# Patient Record
Sex: FEMALE | Race: WHITE | NOT HISPANIC OR LATINO | Employment: FULL TIME | URBAN - METROPOLITAN AREA
[De-identification: names, ages, dates, MRNs, and addresses within clinical notes are randomized per-mention and may not be internally consistent; named-entity substitution may affect disease eponyms.]

---

## 2017-02-28 ENCOUNTER — HOSPITAL ENCOUNTER (OUTPATIENT)
Dept: MAMMOGRAPHY | Facility: CLINIC | Age: 57
End: 2017-02-28
Attending: NURSE PRACTITIONER
Payer: COMMERCIAL

## 2017-02-28 ENCOUNTER — HOSPITAL ENCOUNTER (OUTPATIENT)
Dept: MAMMOGRAPHY | Facility: CLINIC | Age: 57
Discharge: HOME | End: 2017-02-28
Attending: NURSE PRACTITIONER | Admitting: NURSE PRACTITIONER
Payer: COMMERCIAL

## 2017-02-28 DIAGNOSIS — N64.4 BREAST PAIN: ICD-10-CM

## 2017-02-28 DIAGNOSIS — N64.59 BREAST THICKENING: ICD-10-CM

## 2017-02-28 PROCEDURE — 76642 ULTRASOUND BREAST LIMITED: CPT | Mod: LT

## 2017-02-28 PROCEDURE — G0204 DX MAMMO INCL CAD BI: HCPCS

## 2017-06-02 ENCOUNTER — HOSPITAL ENCOUNTER (OUTPATIENT)
Dept: MAMMOGRAPHY | Facility: CLINIC | Age: 57
End: 2017-06-02
Attending: NURSE PRACTITIONER
Payer: COMMERCIAL

## 2017-06-02 ENCOUNTER — HOSPITAL ENCOUNTER (OUTPATIENT)
Dept: MAMMOGRAPHY | Facility: CLINIC | Age: 57
Discharge: HOME | End: 2017-06-02
Attending: NURSE PRACTITIONER | Admitting: NURSE PRACTITIONER
Payer: COMMERCIAL

## 2017-06-02 DIAGNOSIS — N64.4 BREAST PAIN: ICD-10-CM

## 2017-06-02 PROCEDURE — G0279 TOMOSYNTHESIS, MAMMO: HCPCS

## 2017-06-02 PROCEDURE — 76642 ULTRASOUND BREAST LIMITED: CPT | Mod: RT

## 2018-04-23 ENCOUNTER — HOSPITAL ENCOUNTER (OUTPATIENT)
Dept: MAMMOGRAPHY | Facility: CLINIC | Age: 58
Discharge: HOME | End: 2018-04-23
Attending: NURSE PRACTITIONER | Admitting: NURSE PRACTITIONER
Payer: COMMERCIAL

## 2018-04-23 DIAGNOSIS — Z12.31 VISIT FOR SCREENING MAMMOGRAM: ICD-10-CM

## 2018-04-23 PROCEDURE — 77067 SCR MAMMO BI INCL CAD: CPT

## 2018-05-24 ENCOUNTER — HOSPITAL ENCOUNTER (OUTPATIENT)
Dept: CT IMAGING | Facility: CLINIC | Age: 58
Discharge: HOME | End: 2018-05-24
Attending: NURSE PRACTITIONER | Admitting: NURSE PRACTITIONER
Payer: COMMERCIAL

## 2018-05-24 DIAGNOSIS — M54.9 RIGHT-SIDED BACK PAIN, UNSPECIFIED BACK LOCATION, UNSPECIFIED CHRONICITY: ICD-10-CM

## 2018-05-24 DIAGNOSIS — R10.2 PELVIC PAIN: ICD-10-CM

## 2018-05-24 PROCEDURE — 25000125 ZZHC RX 250: Performed by: RADIOLOGY

## 2018-05-24 PROCEDURE — 74177 CT ABD & PELVIS W/CONTRAST: CPT

## 2018-05-24 PROCEDURE — 25000128 H RX IP 250 OP 636: Performed by: RADIOLOGY

## 2018-05-24 RX ORDER — IOPAMIDOL 755 MG/ML
110 INJECTION, SOLUTION INTRAVASCULAR ONCE
Status: COMPLETED | OUTPATIENT
Start: 2018-05-24 | End: 2018-05-24

## 2018-05-24 RX ADMIN — IOPAMIDOL 110 ML: 755 INJECTION, SOLUTION INTRAVENOUS at 14:54

## 2018-05-24 RX ADMIN — SODIUM CHLORIDE 72 ML: 9 INJECTION, SOLUTION INTRAVENOUS at 14:54

## 2019-06-26 ENCOUNTER — HOSPITAL ENCOUNTER (OUTPATIENT)
Dept: MAMMOGRAPHY | Facility: CLINIC | Age: 59
Discharge: HOME | End: 2019-06-26
Attending: NURSE PRACTITIONER | Admitting: NURSE PRACTITIONER
Payer: COMMERCIAL

## 2019-06-26 DIAGNOSIS — Z12.31 VISIT FOR SCREENING MAMMOGRAM: ICD-10-CM

## 2019-06-26 PROCEDURE — 77063 BREAST TOMOSYNTHESIS BI: CPT

## 2019-09-12 ENCOUNTER — TRANSFERRED RECORDS (OUTPATIENT)
Dept: HEALTH INFORMATION MANAGEMENT | Facility: CLINIC | Age: 59
End: 2019-09-12

## 2019-09-17 ENCOUNTER — MEDICAL CORRESPONDENCE (OUTPATIENT)
Dept: HEALTH INFORMATION MANAGEMENT | Facility: CLINIC | Age: 59
End: 2019-09-17

## 2019-09-18 ENCOUNTER — TRANSFERRED RECORDS (OUTPATIENT)
Dept: HEALTH INFORMATION MANAGEMENT | Facility: CLINIC | Age: 59
End: 2019-09-18

## 2019-11-08 ENCOUNTER — PRE VISIT (OUTPATIENT)
Dept: CARDIOLOGY | Facility: CLINIC | Age: 59
End: 2019-11-08

## 2019-11-20 ENCOUNTER — OFFICE VISIT (OUTPATIENT)
Dept: CARDIOLOGY | Facility: CLINIC | Age: 59
End: 2019-11-20
Payer: COMMERCIAL

## 2019-11-20 VITALS
DIASTOLIC BLOOD PRESSURE: 85 MMHG | BODY MASS INDEX: 37.45 KG/M2 | SYSTOLIC BLOOD PRESSURE: 127 MMHG | HEIGHT: 66 IN | HEART RATE: 86 BPM | WEIGHT: 233 LBS

## 2019-11-20 DIAGNOSIS — R73.03 BORDERLINE DIABETES MELLITUS: ICD-10-CM

## 2019-11-20 DIAGNOSIS — E78.2 MIXED HYPERLIPIDEMIA: Primary | ICD-10-CM

## 2019-11-20 PROCEDURE — 93000 ELECTROCARDIOGRAM COMPLETE: CPT | Performed by: INTERNAL MEDICINE

## 2019-11-20 PROCEDURE — 99204 OFFICE O/P NEW MOD 45 MIN: CPT | Mod: 25 | Performed by: INTERNAL MEDICINE

## 2019-11-20 RX ORDER — BUPROPION HYDROCHLORIDE 150 MG/1
450 TABLET, EXTENDED RELEASE ORAL DAILY
COMMUNITY
End: 2020-12-07

## 2019-11-20 RX ORDER — AMPICILLIN TRIHYDRATE 250 MG
900 CAPSULE ORAL DAILY
COMMUNITY
End: 2019-12-27

## 2019-11-20 RX ORDER — PRAVASTATIN SODIUM 20 MG
20 TABLET ORAL DAILY
Qty: 30 TABLET | Refills: 11 | Status: SHIPPED | OUTPATIENT
Start: 2019-11-20 | End: 2020-02-27

## 2019-11-20 RX ORDER — PANTETHINE 300 MG
450 TABLET, EXTENDED RELEASE ORAL 2 TIMES DAILY
COMMUNITY
End: 2020-12-07

## 2019-11-20 ASSESSMENT — MIFFLIN-ST. JEOR: SCORE: 1658.63

## 2019-11-20 NOTE — LETTER
11/20/2019      JOHAN Donaldson [facility] [place] [address]  [facility] [address]      RE: Dominique Craft       Dear Colleague,    I had the pleasure of seeing Dominique Craft in the [facility].    Service Date: 11/20/2019      REFERRING PROVIDER:  GLEN Nguyen.      HISTORY OF PRESENT ILLNESS:  Ms. Craft is a pleasant 59-year-old female with a history of hyperlipidemia and borderline diabetes.  She is referred to our clinic today for primary prevention and assistance with cholesterol.  She had been on simvastatin years ago and had a rare side effect of thyroid nodules and was subsequently taken off of this.  She has been on red yeast rice and had a vitamin E supplement added.  She had an NMR profile done in April and then subsequently again in September.  I did review these numbers today.  In September her total cholesterol was 194, triglycerides were 233, HDL 45, .  Her small dense LDL particle size was elevated at 679, apolipoprotein A1 159, CRP was elevated at 6.2.  Her hemoglobin A1c has dropped down to 5.3.  Electrolyte panel was normal.  She once again has been on red yeast rice and seems to tolerate this.  I did discuss that this is essentially unprocessed pravastatin, which is our weakest statin agent, and she likely will tolerate that medication prescribed but may benefit from knowing the actual dosage and would help us guide us to titrate as needed for her goals with her cholesterol.  She is willing to exchange red yeast rice for the prescribed pravastatin.  Her cardiac risk factors once again include the hyperlipidemia and borderline diabetes.  She has no family history of premature coronary artery disease.  She does have a remote history of tobacco abuse.  No history of high blood pressure or kidney disease.  She is asymptomatic from a cardiac perspective.  She denies any symptoms of chest pain or shortness of breath.  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      PHYSICAL EXAMINATION:   VITAL SIGNS:  On exam today, blood pressure is 127/85, pulse of 86, weight 233, body mass index of 37.   NECK:  Carotid upstrokes are brisk without bruit.   CARDIOVASCULAR:  Tones are regular without murmur, gallop or rub.   LUNGS:  Clear posteriorly.   EXTREMITIES:  She has strong pulses in the distal extremities without peripheral edema.      I did perform an electrocardiogram today, which I have reviewed and essentially appears normal.      SUMMARY:  Ms. Craft is a very pleasant 59-year-old female with a history of hyperlipidemia, borderline diabetes and elevated CRP.  She has tolerated red yeast rice with some modest improvement in her cholesterol numbers.  I have recommended prescribing her pravastatin that she will likely tolerate based on her tolerance of red yeast rice.  However, we will be able to titrate the dose accordingly to achieve our goals for her cholesterol.  I would recommend an LDL less than 100.  Apolipoprotein A1 is genetic and likely not going to change at all despite our treatment.  I do not feel strongly that we need to continue to monitor that level, and the same goes with the NMR profile now that we have that information.  Based upon the COURAGE study, I think the elevated CRP with her hyperlipidemia and borderline diabetes does place her with the strong recommendation for statin therapy.  Calcium scoring would not necessarily change my recommendation or management; however, I do feel there is some benefit giving her more of a visual image as well as her \"vascular age.\"  Occasionally, we do find significant calcifications and a focal area of a vessel that may prompt different management, such as an exercise stress test, especially since females may not have typical symptoms related to coronary disease, and I discussed this with her as well.  She would like to proceed with a CT " calcium scoring, so I will go ahead and order that for her.  I have prescribed her 20 mg of pravastatin for now in place of the red yeast rice and asked her to follow up with her primary to titrate the level of pravastatin to a goal of LDL less than 100.  I would encourage a healthy lifestyle which includes aerobic activity at least 20 minutes 3-4 times per week and following a heart healthy diet as well, such as the Mediterranean or DASH diet.  She will follow up with the test results of the CT calcium scoring.  I would recommend repeating a basic cholesterol profile in 8 weeks after starting pravastatin.  Please feel free to contact me with any questions you have in regards to her care.  Thank you for allowing me to participate in the care of your very nice patient.      cc:   Cornelia Brasher, [facility]    Clinic [facility], [address], [address] [place] [address]         JOSE ANTONIO CARDONA DO             D: 2019   T: 2019   MT: ERYN      Name:     SOTO DESAI   MRN:      [id number]        Account:      [id number]   :      1960           Service Date: 2019      Document: [id number]         Outpatient Encounter Medications as of 2019   Medication Sig Dispense Refill     buPROPion (WELLBUTRIN SR) 150 MG 12 hr tablet Take 450 mg by mouth daily       Cholecalciferol (VITAMIN D-3) 125 MCG (5000 UT) TABS Take by mouth daily       metFORMIN (GLUCOPHAGE) 500 MG tablet Take 500 mg by mouth 2 times daily (with meals)       Pantethine  MG TBCR Take 450 mg by mouth 2 times daily       pravastatin (PRAVACHOL) 20 MG tablet Take 1 tablet (20 mg) by mouth daily 30 tablet 11     red yeast rice 600 MG CAPS Take 900 mg by mouth daily       omeprazole (PRILOSEC) 20 MG capsule Take by mouth daily Once a daily       [DISCONTINUED] atorvastatin (LIPITOR) 20 MG tablet Take one tablet by mouth one time daily 90 tablet 2     No facility-administered encounter medications  on file as of 11/20/2019.        Again, thank you for allowing me to participate in the care of your patient.      Sincerely,    Nichole Sebastian,      [facility]

## 2019-11-20 NOTE — PROGRESS NOTES
Service Date: 11/20/2019      REFERRING PROVIDER:  GLEN Nguyen.      HISTORY OF PRESENT ILLNESS:  Ms. Craft is a pleasant 59-year-old female with a history of hyperlipidemia and borderline diabetes.  She is referred to our clinic today for primary prevention and assistance with cholesterol.  She had been on simvastatin years ago and had a rare side effect of thyroid nodules and was subsequently taken off of this.  She has been on red yeast rice and had a vitamin E supplement added.  She had an NMR profile done in April and then subsequently again in September.  I did review these numbers today.  In September her total cholesterol was 194, triglycerides were 233, HDL 45, .  Her small dense LDL particle size was elevated at 679, apolipoprotein A1 159, CRP was elevated at 6.2.  Her hemoglobin A1c has dropped down to 5.3.  Electrolyte panel was normal.  She once again has been on red yeast rice and seems to tolerate this.  I did discuss that this is essentially unprocessed pravastatin, which is our weakest statin agent, and she likely will tolerate that medication prescribed but may benefit from knowing the actual dosage and would help us guide us to titrate as needed for her goals with her cholesterol.  She is willing to exchange red yeast rice for the prescribed pravastatin.  Her cardiac risk factors once again include the hyperlipidemia and borderline diabetes.  She has no family history of premature coronary artery disease.  She does have a remote history of tobacco abuse.  No history of high blood pressure or kidney disease.  She is asymptomatic from a cardiac perspective.  She denies any symptoms of chest pain or shortness of breath.  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      PHYSICAL EXAMINATION:   VITAL SIGNS:  On exam today, blood pressure is 127/85, pulse of 86, weight 233, body mass index of 37.   NECK:  Carotid upstrokes are brisk without  "bruit.   CARDIOVASCULAR:  Tones are regular without murmur, gallop or rub.   LUNGS:  Clear posteriorly.   EXTREMITIES:  She has strong pulses in the distal extremities without peripheral edema.      I did perform an electrocardiogram today, which I have reviewed and essentially appears normal.      SUMMARY:  Ms. Craft is a very pleasant 59-year-old female with a history of hyperlipidemia, borderline diabetes and elevated CRP.  She has tolerated red yeast rice with some modest improvement in her cholesterol numbers.  I have recommended prescribing her pravastatin that she will likely tolerate based on her tolerance of red yeast rice.  However, we will be able to titrate the dose accordingly to achieve our goals for her cholesterol.  I would recommend an LDL less than 100.  Apolipoprotein A1 is genetic and likely not going to change at all despite our treatment.  I do not feel strongly that we need to continue to monitor that level, and the same goes with the NMR profile now that we have that information.  Based upon the COURAGE study, I think the elevated CRP with her hyperlipidemia and borderline diabetes does place her with the strong recommendation for statin therapy.  Calcium scoring would not necessarily change my recommendation or management; however, I do feel there is some benefit giving her more of a visual image as well as her \"vascular age.\"  Occasionally, we do find significant calcifications and a focal area of a vessel that may prompt different management, such as an exercise stress test, especially since females may not have typical symptoms related to coronary disease, and I discussed this with her as well.  She would like to proceed with a CT calcium scoring, so I will go ahead and order that for her.  I have prescribed her 20 mg of pravastatin for now in place of the red yeast rice and asked her to follow up with her primary to titrate the level of pravastatin to a goal of LDL less than 100.  I " would encourage a healthy lifestyle which includes aerobic activity at least 20 minutes 3-4 times per week and following a heart healthy diet as well, such as the Mediterranean or DASH diet.  She will follow up with the test results of the CT calcium scoring.  I would recommend repeating a basic cholesterol profile in 8 weeks after starting pravastatin.  Please feel free to contact me with any questions you have in regards to her care.  Thank you for allowing me to participate in the care of your very nice patient.      cc:   Cornelia Brasher, [facility], [address], [address] [place] [address]         JOSE ANTONIO CARDONA DO             D: 2019   T: 2019   MT: ERYN      Name:     SOTO DESAI   MRN:      [id number]        Account:      [id number]   :      1960           Service Date: 2019      Document: [id number]

## 2019-11-20 NOTE — LETTER
11/20/2019    Cornelia Brasher, [facility]  Clinic [place] [address]  [facility] [address]    RE: Dominique Craft       Dear Colleague,    I had the pleasure of seeing Dominique Craft in the [facility].    HPI and Plan:   See dictation    Orders Placed This Encounter   Procedures     CT Coronary Calcium Scan     Follow-Up with Cardiac Advanced Practice Provider     EKG 12-lead complete w/read - Clinics (performed today)       Orders Placed This Encounter   Medications     buPROPion (WELLBUTRIN SR) 150 MG 12 hr tablet     Sig: Take 450 mg by mouth daily     metFORMIN (GLUCOPHAGE) 500 MG tablet     Sig: Take 500 mg by mouth 2 times daily (with meals)     Cholecalciferol (VITAMIN D-3) 125 MCG (5000 UT) TABS     Sig: Take by mouth daily     red yeast rice 600 MG CAPS     Sig: Take 900 mg by mouth daily     Pantethine  MG TBCR     Sig: Take 450 mg by mouth 2 times daily     pravastatin (PRAVACHOL) 20 MG tablet     Sig: Take 1 tablet (20 mg) by mouth daily     Dispense:  30 tablet     Refill:  11       Medications Discontinued During This Encounter   Medication Reason     atorvastatin (LIPITOR) 20 MG tablet Not filled/taken by Patient         Encounter Diagnosis   Name Primary?     Mixed hyperlipidemia Yes       CURRENT MEDICATIONS:  Current Outpatient Medications   Medication Sig Dispense Refill     buPROPion (WELLBUTRIN SR) 150 MG 12 hr tablet Take 450 mg by mouth daily       Cholecalciferol (VITAMIN D-3) 125 MCG (5000 UT) TABS Take by mouth daily       metFORMIN (GLUCOPHAGE) 500 MG tablet Take 500 mg by mouth 2 times daily (with meals)       Pantethine  MG TBCR Take 450 mg by mouth 2 times daily       pravastatin (PRAVACHOL) 20 MG tablet Take 1 tablet (20 mg) by mouth daily 30 tablet 11     red yeast rice 600 MG CAPS Take 900 mg by mouth daily       omeprazole (PRILOSEC) 20 MG capsule Take by mouth daily Once a daily         ALLERGIES     Allergies    Allergen Reactions     Amoxicillin      Naproxen      Simvastatin        PAST MEDICAL HISTORY:  Past Medical History:   Diagnosis Date     DVT (deep venous thrombosis) (H)     after tummy tuck in , right leg     Epistaxis     ENT     Gallstones     s/p lisa     GERD (gastroesophageal reflux disease)      Hyperlipaemia      Insomnia      Nephrolithiasis      Obesity (BMI 30-39.9)      PE (pulmonary embolism)     after tummy tuch      Thyroid nodules     benign and biopsied, previously on Sythroid       PAST SURGICAL HISTORY:  Past Surgical History:   Procedure Laterality Date     APPENDECTOMY OPEN       CHOLECYSTECTOMY       COLONOSCOPY  3/28/2014    Procedure: COLONOSCOPY;  COLONOSCOPY (MAC SEDATION);  Surgeon: Thais Gregory MD;  Location:  GI     COSMETIC ABDOMINOPLASTY  2006 and 2007    tummy tuck and breast lift , revision of abdominoplasty in       HYSTERECTOMY  2008    prophylactic due to FH of cancer       FAMILY HISTORY:  Family History   Problem Relation Age of Onset     Cancer Sister         ovarian cancer     Obesity Sister         s/p Vitor-en-y     Cancer Mother         kidney, colon and liver     Cancer - colorectal Mother        SOCIAL HISTORY:  Social History     Socioeconomic History     Marital status:      Spouse name: None     Number of children: 0     Years of education: None     Highest education level: None   Occupational History     Occupation:      Employer: [employer]   Social Needs     Financial resource strain: None     Food insecurity:     Worry: None     Inability: None     Transportation needs:     Medical: None     Non-medical: None   Tobacco Use     Smoking status: Former Smoker     Packs/day: 1.00     Years: 25.00     Pack years: 25.00     Types: Cigarettes     Last attempt to quit: 1996     Years since quittin.4     Smokeless tobacco: Never Used   Substance and Sexual Activity     Alcohol use: Yes     Comment: 1/month      "Drug use: No     Sexual activity: Yes     Partners: Male     Birth control/protection: Other     Comment: hysterectomy   Lifestyle     Physical activity:     Days per week: None     Minutes per session: None     Stress: None   Relationships     Social connections:     Talks on phone: None     Gets together: None     Attends Zoroastrianism service: None     Active member of club or organization: None     Attends meetings of clubs or organizations: None     Relationship status: None     Intimate partner violence:     Fear of current or ex partner: None     Emotionally abused: None     Physically abused: None     Forced sexual activity: None   Other Topics Concern      Service No     Blood Transfusions No     Caffeine Concern Yes     Occupational Exposure No     Hobby Hazards No     Sleep Concern Yes     Stress Concern Yes     Weight Concern Yes     Special Diet No     Back Care No     Exercise Yes     Bike Helmet No     Seat Belt Yes     Self-Exams Yes     Parent/sibling w/ CABG, MI or angioplasty before 65F 55M? No   Social History Narrative           Review of Systems:  Skin:  Negative       Eyes:  Positive for glasses    ENT:  Negative      Respiratory:  Negative       Cardiovascular:  Negative;palpitations;chest pain;dizziness;syncope or near-syncope;cyanosis;lightheadedness;exercise intolerance;fatigue;edema      Gastroenterology: Negative      Genitourinary:  Negative      Musculoskeletal:  Negative      Neurologic:  Negative      Psychiatric:  Positive for excessive stress managed with medication  Heme/Lymph/Imm:  Negative      Endocrine:  Negative        Physical Exam:  Vitals: /85 (BP Location: Left arm, Cuff Size: Adult Large)   Pulse 86   Ht 1.676 m (5' 6\")   Wt 105.7 kg (233 lb)   BMI 37.61 kg/m       Constitutional:  cooperative;in no acute distress        Skin:  warm and dry to the touch          Head:  normocephalic        Eyes:  pupils equal and round        Lymph:      ENT:  no " pallor or cyanosis        Neck:  carotid pulses are full and equal bilaterally        Respiratory:  clear to auscultation;normal symmetry         Cardiac: regular rhythm;no murmurs, gallops or rubs detected                pulses full and equal                                        GI:  abdomen soft;no bruits        Extremities and Muscular Skeletal:  no deformities, clubbing, cyanosis, erythema observed;no edema              Neurological:  no gross motor deficits;affect appropriate        Psych:  Alert and Oriented x 3          CC  No referring provider defined for this encounter.                    Thank you for allowing me to participate in the care of your patient.      Sincerely,     Nichole Sebastian, DO     [facility]    cc:   No referring provider defined for this encounter.

## 2019-11-20 NOTE — PROGRESS NOTES
HPI and Plan:   See dictation    Orders Placed This Encounter   Procedures     CT Coronary Calcium Scan     Follow-Up with Cardiac Advanced Practice Provider     EKG 12-lead complete w/read - Clinics (performed today)       Orders Placed This Encounter   Medications     buPROPion (WELLBUTRIN SR) 150 MG 12 hr tablet     Sig: Take 450 mg by mouth daily     metFORMIN (GLUCOPHAGE) 500 MG tablet     Sig: Take 500 mg by mouth 2 times daily (with meals)     Cholecalciferol (VITAMIN D-3) 125 MCG (5000 UT) TABS     Sig: Take by mouth daily     red yeast rice 600 MG CAPS     Sig: Take 900 mg by mouth daily     Pantethine  MG TBCR     Sig: Take 450 mg by mouth 2 times daily     pravastatin (PRAVACHOL) 20 MG tablet     Sig: Take 1 tablet (20 mg) by mouth daily     Dispense:  30 tablet     Refill:  11       Medications Discontinued During This Encounter   Medication Reason     atorvastatin (LIPITOR) 20 MG tablet Not filled/taken by Patient         Encounter Diagnosis   Name Primary?     Mixed hyperlipidemia Yes       CURRENT MEDICATIONS:  Current Outpatient Medications   Medication Sig Dispense Refill     buPROPion (WELLBUTRIN SR) 150 MG 12 hr tablet Take 450 mg by mouth daily       Cholecalciferol (VITAMIN D-3) 125 MCG (5000 UT) TABS Take by mouth daily       metFORMIN (GLUCOPHAGE) 500 MG tablet Take 500 mg by mouth 2 times daily (with meals)       Pantethine  MG TBCR Take 450 mg by mouth 2 times daily       pravastatin (PRAVACHOL) 20 MG tablet Take 1 tablet (20 mg) by mouth daily 30 tablet 11     red yeast rice 600 MG CAPS Take 900 mg by mouth daily       omeprazole (PRILOSEC) 20 MG capsule Take by mouth daily Once a daily         ALLERGIES     Allergies   Allergen Reactions     Amoxicillin      Naproxen      Simvastatin        PAST MEDICAL HISTORY:  Past Medical History:   Diagnosis Date     DVT (deep venous thrombosis) (H)     after tummy tuck in 2006, right leg     Epistaxis     ENT     Gallstones     s/p lisa      GERD (gastroesophageal reflux disease)      Hyperlipaemia      Insomnia      Nephrolithiasis      Obesity (BMI 30-39.9)      PE (pulmonary embolism)     after tummy tuch      Thyroid nodules     benign and biopsied, previously on Sythroid       PAST SURGICAL HISTORY:  Past Surgical History:   Procedure Laterality Date     APPENDECTOMY OPEN       CHOLECYSTECTOMY       COLONOSCOPY  3/28/2014    Procedure: COLONOSCOPY;  COLONOSCOPY (MAC SEDATION);  Surgeon: Thais Gregory MD;  Location:  GI     COSMETIC ABDOMINOPLASTY  2006 and 2007    tummy tuck and breast lift , revision of abdominoplasty in       HYSTERECTOMY  2008    prophylactic due to FH of cancer       FAMILY HISTORY:  Family History   Problem Relation Age of Onset     Cancer Sister         ovarian cancer     Obesity Sister         s/p Vitor-en-y     Cancer Mother         kidney, colon and liver     Cancer - colorectal Mother        SOCIAL HISTORY:  Social History     Socioeconomic History     Marital status:      Spouse name: None     Number of children: 0     Years of education: None     Highest education level: None   Occupational History     Occupation:      Employer: [employer]   Social Needs     Financial resource strain: None     Food insecurity:     Worry: None     Inability: None     Transportation needs:     Medical: None     Non-medical: None   Tobacco Use     Smoking status: Former Smoker     Packs/day: 1.00     Years: 25.00     Pack years: 25.00     Types: Cigarettes     Last attempt to quit: 1996     Years since quittin.4     Smokeless tobacco: Never Used   Substance and Sexual Activity     Alcohol use: Yes     Comment: 1/month     Drug use: No     Sexual activity: Yes     Partners: Male     Birth control/protection: Other     Comment: hysterectomy   Lifestyle     Physical activity:     Days per week: None     Minutes per session: None     Stress: None   Relationships     Social connections:      "Talks on phone: None     Gets together: None     Attends Buddhism service: None     Active member of club or organization: None     Attends meetings of clubs or organizations: None     Relationship status: None     Intimate partner violence:     Fear of current or ex partner: None     Emotionally abused: None     Physically abused: None     Forced sexual activity: None   Other Topics Concern      Service No     Blood Transfusions No     Caffeine Concern Yes     Occupational Exposure No     Hobby Hazards No     Sleep Concern Yes     Stress Concern Yes     Weight Concern Yes     Special Diet No     Back Care No     Exercise Yes     Bike Helmet No     Seat Belt Yes     Self-Exams Yes     Parent/sibling w/ CABG, MI or angioplasty before 65F 55M? No   Social History Narrative           Review of Systems:  Skin:  Negative       Eyes:  Positive for glasses    ENT:  Negative      Respiratory:  Negative       Cardiovascular:  Negative;palpitations;chest pain;dizziness;syncope or near-syncope;cyanosis;lightheadedness;exercise intolerance;fatigue;edema      Gastroenterology: Negative      Genitourinary:  Negative      Musculoskeletal:  Negative      Neurologic:  Negative      Psychiatric:  Positive for excessive stress managed with medication  Heme/Lymph/Imm:  Negative      Endocrine:  Negative        Physical Exam:  Vitals: /85 (BP Location: Left arm, Cuff Size: Adult Large)   Pulse 86   Ht 1.676 m (5' 6\")   Wt 105.7 kg (233 lb)   BMI 37.61 kg/m      Constitutional:  cooperative;in no acute distress        Skin:  warm and dry to the touch          Head:  normocephalic        Eyes:  pupils equal and round        Lymph:      ENT:  no pallor or cyanosis        Neck:  carotid pulses are full and equal bilaterally        Respiratory:  clear to auscultation;normal symmetry         Cardiac: regular rhythm;no murmurs, gallops or rubs detected                pulses full and equal                               "          GI:  abdomen soft;no bruits        Extremities and Muscular Skeletal:  no deformities, clubbing, cyanosis, erythema observed;no edema              Neurological:  no gross motor deficits;affect appropriate        Psych:  Alert and Oriented x 3          CC  No referring provider defined for this encounter.

## 2019-12-26 ENCOUNTER — HOSPITAL ENCOUNTER (OUTPATIENT)
Dept: CARDIOLOGY | Facility: CLINIC | Age: 59
Discharge: HOME | End: 2019-12-26
Attending: INTERNAL MEDICINE | Admitting: INTERNAL MEDICINE
Payer: COMMERCIAL

## 2019-12-26 DIAGNOSIS — E78.2 MIXED HYPERLIPIDEMIA: ICD-10-CM

## 2019-12-26 PROCEDURE — 75571 CT HRT W/O DYE W/CA TEST: CPT | Mod: 26 | Performed by: INTERNAL MEDICINE

## 2019-12-26 PROCEDURE — 75571 CT HRT W/O DYE W/CA TEST: CPT | Mod: GA

## 2019-12-27 ENCOUNTER — OFFICE VISIT (OUTPATIENT)
Dept: CARDIOLOGY | Facility: CLINIC | Age: 59
End: 2019-12-27
Attending: INTERNAL MEDICINE
Payer: COMMERCIAL

## 2019-12-27 VITALS
SYSTOLIC BLOOD PRESSURE: 120 MMHG | HEIGHT: 66 IN | HEART RATE: 91 BPM | BODY MASS INDEX: 36.64 KG/M2 | WEIGHT: 228 LBS | DIASTOLIC BLOOD PRESSURE: 87 MMHG

## 2019-12-27 DIAGNOSIS — E78.2 MIXED HYPERLIPIDEMIA: ICD-10-CM

## 2019-12-27 PROCEDURE — 99213 OFFICE O/P EST LOW 20 MIN: CPT | Performed by: NURSE PRACTITIONER

## 2019-12-27 ASSESSMENT — MIFFLIN-ST. JEOR: SCORE: 1635.95

## 2019-12-27 NOTE — LETTER
12/27/2019    JOHAN Donaldson [facility] Sandra Martin [address]  [facility] [address]    RE: Dominique Craft       Dear Colleague,    I had the pleasure of seeing Dominique Craft in the [facility].    Cardiology Clinic Progress Note  Dominique Craft MRN# [id number]   YOB: 1960 Age: 59 year old     Reason For Visit:  Review of results   Primary Cardiologist:   Dr. Sebastian          History of Presenting Illness:      Dominique Craft is a pleasant 59 year old patient who carries a past medical history significant for hyperlipidemia and borderline diabetes.    She was last seen on 11/20/2019 for evaluation and management of her cholesterol.  Last lipid panel showed a total cholesterol of 194, triglycerides 233, HDL 45, .  She reports significant side effects to statins in the past and resorted to treating her cholesterol with red yeast rice.  She was recommended to start low-dose pravastatin as it is a weak statin but would allow us to uptitrate as needed to achieve her cholesterol goal.  She also underwent a CT coronary calcium scoring that showed a score of 0.  She returns to the office today for review of results.     She is feeling well on a cardiac standpoint, denies chest pain, shortness of breath, PND, orthopnea, presyncope, syncope, edema, heart racing, or palpitations.  She reports no side effects such as myalgia pains since starting on low-dose pravastatin.  She plans to have a follow-up lipid panel next week.    Blood pressure is well controlled at 120/87  BMI 36.8, down approximately 5 pounds over the last month.    Activity level is unchanged  Follows a heart healthy diet  Remains compliant with all medications.                   Assessment and Plan:       1.  Hyperlipidemia -last , initiated on low-dose pravastatin due to side effects with higher dose statins.  Scheduled for follow-up lipid panel next week.  CT  coronary calcium score is 0.  Recommend risk modification including exercise, weight loss, and heart healthy diet (DASH diet).  Will notify her of the results of her fasting lipid panel.  Recommend she follow-up with her PCP annually and  heart as needed.                Thank you for allowing me to participate in this delightful patient's care.        Jennifer Reddy, JOHAN CNP         Review of Systems:     Review of Systems:  Skin:  Negative     Eyes:  Positive for glasses  ENT:  Negative    Respiratory:  Negative    Cardiovascular:  Negative    Gastroenterology: Negative    Genitourinary:  Negative    Musculoskeletal:  Negative    Neurologic:  Negative    Psychiatric:  Positive for excessive stress  Heme/Lymph/Imm:  Negative    Endocrine:  Negative                Physical Exam:     GEN:  In general, this is a overweight female in no acute distress.  HEENT:  Pupils equal, round. Sclerae nonicteric. Clear oropharynx. Mucous membranes moist.  NECK: Supple, no masses appreciated. Trachea midline.  No JVD   C/V:  Regular rate and rhythm, no murmur, rub or gallop. No S3 or RV heave.   RESP: Respirations are unlabored. No use of accessory muscles. Clear to auscultation bilaterally without wheezing, rales, or rhonchi.  GI: Abdomen soft, nontender, nondistended. No HSM appreciated.   EXTREM: No LE edema. No cyanosis or clubbing.  NEURO: Alert and oriented, cooperative. No obvious focal deficits.   PSYCH: Normal affect.  SKIN: Warm and dry. No rashes or petechiae appreciated.          Past Medical History:     Past Medical History:   Diagnosis Date     DVT (deep venous thrombosis) (H)     after tummy tuck in 2006, right leg     Epistaxis     ENT     Gallstones     s/p lisa     GERD (gastroesophageal reflux disease)      Hyperlipaemia      Insomnia      Nephrolithiasis      Obesity (BMI 30-39.9)      PE (pulmonary embolism)     after tummy tuch 2006     Thyroid nodules     benign and biopsied, previously on Sythroid               Past Surgical History:     Past Surgical History:   Procedure Laterality Date     APPENDECTOMY OPEN  1981     CHOLECYSTECTOMY  1993     COLONOSCOPY  3/28/2014    Procedure: COLONOSCOPY;  COLONOSCOPY (MAC SEDATION);  Surgeon: Thais Gregory MD;  Location:  GI     COSMETIC ABDOMINOPLASTY  2/2006 and 03/2007    tummy tuck and breast lift , revision of abdominoplasty in  2007     HYSTERECTOMY  2008    prophylactic due to FH of cancer              Allergies:   Amoxicillin; Naproxen; and Simvastatin       Data:   All laboratory data reviewed:    LAST CHOLESTEROL:  Lab Results   Component Value Date    CHOL 158 10/06/2014     Lab Results   Component Value Date    HDL 43 10/06/2014     Lab Results   Component Value Date    LDL 79 10/06/2014     Lab Results   Component Value Date    TRIG 178 10/06/2014     Lab Results   Component Value Date    CHOLHDLRATIO 3.7 10/06/2014       LAST BMP:  Lab Results   Component Value Date     09/20/2013      Lab Results   Component Value Date    POTASSIUM 4.3 09/20/2013     Lab Results   Component Value Date    CHLORIDE 105 09/20/2013     Lab Results   Component Value Date    FLEX 9.3 09/20/2013     Lab Results   Component Value Date    CO2 27 09/20/2013     Lab Results   Component Value Date    BUN 15 09/20/2013     Lab Results   Component Value Date    CR 0.85 09/20/2013     Lab Results   Component Value Date    GLC 97 09/20/2013       LAST CBC:  Lab Results   Component Value Date    WBC 6.2 03/28/2014     Lab Results   Component Value Date    RBC 4.95 03/28/2014     Lab Results   Component Value Date    HGB 14.7 03/28/2014     Lab Results   Component Value Date    HCT 44.7 03/28/2014     Lab Results   Component Value Date    MCV 90 03/28/2014     Lab Results   Component Value Date    MCH 29.7 03/28/2014     Lab Results   Component Value Date    MCHC 32.9 03/28/2014     Lab Results   Component Value Date    RDW 13.4 03/28/2014     Lab Results   Component Value Date      03/28/2014         Thank you for allowing me to participate in the care of your patient.    Sincerely,     JOHAN Chapa [facility]

## 2019-12-27 NOTE — PROGRESS NOTES
Cardiology Clinic Progress Note  Dominique Craft MRN# [id number]   YOB: 1960 Age: 59 year old     Reason For Visit:  Review of results   Primary Cardiologist:   Dr. Sebastian          History of Presenting Illness:      Dominique Craft is a pleasant 59 year old patient who carries a past medical history significant for hyperlipidemia and borderline diabetes.    She was last seen on 11/20/2019 for evaluation and management of her cholesterol.  Last lipid panel showed a total cholesterol of 194, triglycerides 233, HDL 45, .  She reports significant side effects to statins in the past and resorted to treating her cholesterol with red yeast rice.  She was recommended to start low-dose pravastatin as it is a weak statin but would allow us to uptitrate as needed to achieve her cholesterol goal.  She also underwent a CT coronary calcium scoring that showed a score of 0.  She returns to the office today for review of results.     She is feeling well on a cardiac standpoint, denies chest pain, shortness of breath, PND, orthopnea, presyncope, syncope, edema, heart racing, or palpitations.  She reports no side effects such as myalgia pains since starting on low-dose pravastatin.  She plans to have a follow-up lipid panel next week.    Blood pressure is well controlled at 120/87  BMI 36.8, down approximately 5 pounds over the last month.    Activity level is unchanged  Follows a heart healthy diet  Remains compliant with all medications.                   Assessment and Plan:       1.  Hyperlipidemia -last , initiated on low-dose pravastatin due to side effects with higher dose statins.  Scheduled for follow-up lipid panel next week.  CT coronary calcium score is 0.  Recommend risk modification including exercise, weight loss, and heart healthy diet (DASH diet).  Will notify her of the results of her fasting lipid panel.  Recommend she follow-up with her PCP annually and  heart as needed.                 Thank you for allowing me to participate in this delightful patient's care.        Jennifer Reddy, APRN CNP         Review of Systems:     Review of Systems:  Skin:  Negative     Eyes:  Positive for glasses  ENT:  Negative    Respiratory:  Negative    Cardiovascular:  Negative    Gastroenterology: Negative    Genitourinary:  Negative    Musculoskeletal:  Negative    Neurologic:  Negative    Psychiatric:  Positive for excessive stress  Heme/Lymph/Imm:  Negative    Endocrine:  Negative                Physical Exam:     GEN:  In general, this is a overweight female in no acute distress.  HEENT:  Pupils equal, round. Sclerae nonicteric. Clear oropharynx. Mucous membranes moist.  NECK: Supple, no masses appreciated. Trachea midline.  No JVD   C/V:  Regular rate and rhythm, no murmur, rub or gallop. No S3 or RV heave.   RESP: Respirations are unlabored. No use of accessory muscles. Clear to auscultation bilaterally without wheezing, rales, or rhonchi.  GI: Abdomen soft, nontender, nondistended. No HSM appreciated.   EXTREM: No LE edema. No cyanosis or clubbing.  NEURO: Alert and oriented, cooperative. No obvious focal deficits.   PSYCH: Normal affect.  SKIN: Warm and dry. No rashes or petechiae appreciated.          Past Medical History:     Past Medical History:   Diagnosis Date     DVT (deep venous thrombosis) (H)     after tummy tuck in 2006, right leg     Epistaxis     ENT     Gallstones     s/p lisa     GERD (gastroesophageal reflux disease)      Hyperlipaemia      Insomnia      Nephrolithiasis      Obesity (BMI 30-39.9)      PE (pulmonary embolism)     after tummy tuch 2006     Thyroid nodules     benign and biopsied, previously on Sythroid              Past Surgical History:     Past Surgical History:   Procedure Laterality Date     APPENDECTOMY OPEN  1981     CHOLECYSTECTOMY  1993     COLONOSCOPY  3/28/2014    Procedure: COLONOSCOPY;  COLONOSCOPY (MAC SEDATION);  Surgeon: Thais Gregory MD;   Location:  GI     COSMETIC ABDOMINOPLASTY  2/2006 and 03/2007    tummy tuck and breast lift , revision of abdominoplasty in  2007     HYSTERECTOMY  2008    prophylactic due to FH of cancer              Allergies:   Amoxicillin; Naproxen; and Simvastatin       Data:   All laboratory data reviewed:    LAST CHOLESTEROL:  Lab Results   Component Value Date    CHOL 158 10/06/2014     Lab Results   Component Value Date    HDL 43 10/06/2014     Lab Results   Component Value Date    LDL 79 10/06/2014     Lab Results   Component Value Date    TRIG 178 10/06/2014     Lab Results   Component Value Date    CHOLHDLRATIO 3.7 10/06/2014       LAST BMP:  Lab Results   Component Value Date     09/20/2013      Lab Results   Component Value Date    POTASSIUM 4.3 09/20/2013     Lab Results   Component Value Date    CHLORIDE 105 09/20/2013     Lab Results   Component Value Date    FLEX 9.3 09/20/2013     Lab Results   Component Value Date    CO2 27 09/20/2013     Lab Results   Component Value Date    BUN 15 09/20/2013     Lab Results   Component Value Date    CR 0.85 09/20/2013     Lab Results   Component Value Date    GLC 97 09/20/2013       LAST CBC:  Lab Results   Component Value Date    WBC 6.2 03/28/2014     Lab Results   Component Value Date    RBC 4.95 03/28/2014     Lab Results   Component Value Date    HGB 14.7 03/28/2014     Lab Results   Component Value Date    HCT 44.7 03/28/2014     Lab Results   Component Value Date    MCV 90 03/28/2014     Lab Results   Component Value Date    MCH 29.7 03/28/2014     Lab Results   Component Value Date    MCHC 32.9 03/28/2014     Lab Results   Component Value Date    RDW 13.4 03/28/2014     Lab Results   Component Value Date     03/28/2014

## 2019-12-27 NOTE — PATIENT INSTRUCTIONS
Thanks for coming into [facility] today.    Reviewed results of Calcium score - 0  Will get Cholesterol level next week for follow up on pravastatin.   Blood pressures well controlled  Encourage exercise, weight loss, and heart healthy diet.   Follow up with PCP annually and  Heart as needed       Please call my nurse at [phone] with any questions or concerns.    Scheduling phone number: [phone]  Reminder: Please bring in all current medications, over the counter supplements and vitamin bottles to your next appointment.        Patient Education     Lowering Your Blood Pressure with DASH  What is the DASH eating plan?  DASH (Dietary Approaches to Stop Hypertension) can help you prevent or lower high blood pressure. This eating plan provides the nutrients that help lower blood pressure: potassium, magnesium, calcium, protein and fiber.   If not controlled, high blood pressure may lead to heart disease, stroke or blindness.  This eating plan is rich in:     Fruits and vegetables    Whole grains    Fat-free or low-fat milk products    Fish and poultry (chicken, turkey, etc.)    Beans, seeds and nuts.  This eating plan is low in:     Salt and sodium    Sugar, sweets and drinks with sugar    Red meat, saturated fat and trans fat.  Lifestyle changes  Besides a healthy eating plan, other steps are needed to help control high blood pressure.     Stay at a healthy weight.    Be active for at least 30 minutes on most days.    If you drink alcohol, have no more than two drinks per day (for men) or one per day (for women).    If you take blood pressure medicine, take it as directed.  Losing weight with DASH  You can lose weight by eating fewer calories. It is best to take off pounds slowly over time. Talk to a dietitian about the best way to do this.  Staying active   To shed pounds, combine DASH with daily exercise. Start with a 15-minute walk each day. Slowly increase the time until you  reach a total of 30 minutes on most days. To avoid weight gain, try for 60 minutes each day. Check with your doctor before starting any exercise program.  Tips for less sodium    Avoid processed foods. These may include baked goods, cereals, soy sauce and even antacids.    Cook with less salt. Don't bring the saltshaker to the table.    Season food with herbs, spices, lemon, lime, vinegar, wine and salt-free seasoning blends.    Use low-sodium canned vegetables or fruits.  Tips to ease the change  Take a week or two to slowly make changes to your diet.    Add a serving of vegetables at lunch one day. The next day, add a serving at dinner as well.    Add fruit to one meal or eat it as a snack.    Work up to three servings of fat-free and low-fat milk products each day.    If you eat large portions of meat, cut back by a third at each meal. The goal is to eat 6 oz (ounces) of meat or less per day.    Serve brown rice and whole-wheat bread and pasta.    Try casseroles and stir-lo dishes that have less meat and more vegetables, grains or cooked dry beans.    Serve two or more meatless meals each week.    For snacks and desserts, eat foods low in fat, sodium and sugar, such as:  ? Unsalted rice cakes, nuts or seeds  ? Fresh fruits, raw vegetables and raisins  ? Fat-free, low-fat or frozen yogurt  ? Popcorn with no salt or butter.    If you have trouble digesting milk products, try lactose-free milk or take lactase pills.    If you have a nut allergy, eat seeds, beans, lentils or split peas.    Fruits, vegetables and whole grain foods are high in fiber. To avoid bloating and diarrhea (loose, watery stools), increase these foods over several weeks.  The DASH eating plan  Use this chart to plan your meals. Or take it with you when you shop for food.   Food Group Servings per Day  Serving Size Examples    1,600 Calories 2,000 Calories 2,600 Calories      Grains  (whole wheat) 6 6 to 8 10 to 11   1 slice bread    1 oz dry  cereal      cup cooked rice, pasta or cereal Whole-wheat bread and rolls, whole-wheat pasta, English muffin, estela bread, bagel, cereals, grits, oatmeal, brown rice, unsalted pretzels and popcorn   Vegetables  3 to 4 4 to 5 5 to 6   1 cup raw leafy vegetables      cup cut-up raw or cooked vegetable      cup vegetable juice Broccoli, carrots, collards, green beans, green peas, kale, lima beans, potatoes, spinach, squash, sweet potatoes, tomatoes   Fruits 4 4 to 5 5 to 6   1 medium fruit      cup dried fruit      cup fresh, frozen, or canned fruit      cup fruit juice Apples, apricots, bananas, dates, grapes, oranges, grapefruit, mangoes, melons, peaches, pineapples, raisins, strawberries, tangerines   Fat-free or   low-fat milk and milk products 2 to 3 2 to 3 3   1 cup milk or yogurt    1   oz cheese Fat-free or low-fat (1%) milk, buttermilk, cheese, regular or frozen yogurt   Lean meats, poultry and fish 3 to 6 6 or less 6   1 oz cooked meats, poultry (chicken, turkey) or fish    1 egg    2 egg whites Lean meats (trim away any fat, then broil, roast or poach); remove skin from poultry. Eggs are high in cholesterol, so limit egg yolks to four or less per week.   Nuts, seeds   and legumes 3 per week 4 to 5 per week 1 per day   ? cup (1   oz) nuts    2 Tbsp peanut butter    2 Tbsp (   oz) seeds      cup cooked legumes (dry beans and peas) Almonds, hazelnuts, mixed nuts, peanuts, walnuts, sunflower seeds, peanut butter, kidney beans, lentils, split peas   Fats and oils 2 2 to 3 3   1 tsp soft margarine    1 tsp vegetable oil    1 Tbsp fried    2 Tbsp salad dressing Soft margarine, vegetable oil (such as canola, corn, olive or safflower), low-fat mayonnaise, light salad dressing   Sweets and   added sugars 0 5 or less per week 2 or less per day   1 Tbsp sugar, jelly or jam      cup sorbet or gelatin    1 cup lemonade Fruit-flavored gelatin, fruit punch, hard candy, jelly, maple syrup, sorbets and ices   A sample meal  "plan  This sample menu gives two sodium levels. Start with 2,300 mg of sodium (about 1 teaspoon of table salt per day). Then, try to lower your intake to 1,500 mg a day. Talk to your doctor or dietitian about how to do this.   These samples are for people who eat 2,000 calories per day. The less salt you eat, the more you may lower your blood pressure.   Menu for 2,300 mg sodium per day   Breakfast:   cup instant oatmeal, 1 mini whole-wheat bagel, 1 tablespoon peanut butter, 1 medium banana, 1 cup (8 ounces) low-fat milk.   Lunch: 1 cup cantaloupe chunks, 1 cup apple juice and one chicken breast sandwich that includes:    Two slices (3 ounces) chicken breast, no skin    Two slices whole-wheat bread    1 slice (   ounce) reduced-fat cheddar cheese    One leaf raghu lettuce    Two slices tomato    1 tablespoon low-fat mayonnaise.  Dinner: 1 cup cooked spaghetti,   cup low-salt vegetarian spaghetti sauce, 3 tablespoons Parmesan cheese;   cup corn (from frozen);   cup canned pears in juice; one spinach salad that includes:    1 cup fresh spinach leaves      cup fresh carrots, grated      cup fresh mushrooms, sliced    1 tablespoon vinegar and oil dressing.  Snacks:? cup unsalted almonds;   cup dried apricots; 1 cup fat-free fruit yogurt, no sugar added.  Reducing to 1,500 mg sodium per day  Use the same menu plan, but:    For breakfast, replace instant oatmeal with regular oatmeal and 1 teaspoon cinnamon.    For lunch, replace cheddar cheese with low-sodium Swiss cheese.  Resources on diet and health  National Heart, Lung and Blood [place]  [employer] [facility] Information Center  [address] [address]   [place] [address]   Phone: [phone]   TTY: [phone]   www.nhlbi.nih.gov   \"Aim for a Healthy Weight\"   www.nhlbi.nih.gov/health/public/heart/obesity/lose_wt/index.htm  \"Dietary Guidelines for Americans 2005\"   and \"A Healthier You\"   www.healthierus.gov/dietaryguidelines  For informational purposes only. Not to " "replace the advice of your health care provider. Adapted from \"Your Guide to Lowering Blood Pressure with DASH,\" by the National Heart, Lung and Blood [place],   NIH Publication No. , revised April 2006. Available at www.nhlbi.nih.gov/health/public/heart/hbp/dash/index.htm. Published by [employer]. [employer] 529230 - REV 09/15.  For informational purposes only. Not to replace the advice of your health care provider.  Copyright   2018 [employer]. All rights reserved.           "

## 2020-01-30 ENCOUNTER — TRANSFERRED RECORDS (OUTPATIENT)
Dept: HEALTH INFORMATION MANAGEMENT | Facility: CLINIC | Age: 60
End: 2020-01-30

## 2020-01-30 LAB
EER LIPOFIT BY NMR: ABNORMAL
HDL CHOLESTEROL: 48 (ref 39–?)
HDL SIZE: 8.4 (ref 8.9–?)
LDL CHOLESTEROL: 128 (ref ?–129)
LDL PARTICLE SIZE: 20.7 (ref 20.7–?)
Lab: 1672 (ref ?–1135)
Lab: 2.8 (ref 4.2–?)
Lab: 39.4 (ref 33–?)
Lab: 7.9 (ref ?–2.7)
SMALL LDL PARTICLE NUMBER: 789 (ref ?–634)
TOTAL CHOLESTEROL: 212 (ref ?–199)
TRIGL SERPL-MCNC: 180 MG/DL (ref ?–149)
VLDL SIZE: 52.6 (ref ?–46.7)

## 2020-02-10 ENCOUNTER — DOCUMENTATION ONLY (OUTPATIENT)
Dept: CARDIOLOGY | Facility: CLINIC | Age: 60
End: 2020-02-10

## 2020-02-25 NOTE — PROGRESS NOTES
Reviewed NMR lipid profile showing   Units 3wk ago   1/30/20 5yr ago   10/6/14 5yr ago   6/12/14    Total Cholesterol 199 212     Triglycerides 149 180   178  R, CM   226  R, CM   HDL Cholesterol 39 48     LDL Cholesterol 129 128     HDL Size 8.9 8.4     VLDL Size 46.7 52.6     LDL Particle Size 20.7 20.7     Lge HDL Particle number 4.2 2.8     HDL Particle Number NMR [id number] 39.4     Lge VLDL Part number 2.7 7.9     Small LDL Particle number [id number]     LDL Particle number 1,[id number] 1,672     EER LipoFit by NMR         Will message Jennifer Reddy NP to review. Idalia GAGE

## 2020-02-27 DIAGNOSIS — E78.2 MIXED HYPERLIPIDEMIA: ICD-10-CM

## 2020-02-27 RX ORDER — PRAVASTATIN SODIUM 40 MG
40 TABLET ORAL DAILY
Qty: 90 TABLET | Refills: 0 | Status: SHIPPED | OUTPATIENT
Start: 2020-02-27 | End: 2020-05-27

## 2020-02-27 NOTE — PROGRESS NOTES
"Called pt with lipid results & recommendations from Jennifer Reddy NP, \"LDL not at goal, recommend increasing Pravastatin to 40mg daily. Follow up FLP in 8 weeks.\"  Pt states she is taking her Pravastatin in the morning; advised to take it in the evening due to nocturnal cholesterol synthesis. Prescription escripted to [facility] & order placed for FLP in 8 weeks. Order in chart & will message scheduling to call pt to arrange. Idalia GAGE   "

## 2020-04-30 ENCOUNTER — TRANSFERRED RECORDS (OUTPATIENT)
Dept: HEALTH INFORMATION MANAGEMENT | Facility: CLINIC | Age: 60
End: 2020-04-30

## 2020-04-30 LAB
CHOLEST SERPL-MCNC: 205 MG/DL
HDLC SERPL-MCNC: 50 MG/DL
LDLC SERPL CALC-MCNC: 112 MG/DL
NONHDLC SERPL-MCNC: NORMAL MG/DL
TRIGL SERPL-MCNC: 217 MG/DL

## 2020-05-08 ENCOUNTER — DOCUMENTATION ONLY (OUTPATIENT)
Dept: CARDIOLOGY | Facility: CLINIC | Age: 60
End: 2020-05-08

## 2020-05-22 ENCOUNTER — TELEPHONE (OUTPATIENT)
Dept: CARDIOLOGY | Facility: CLINIC | Age: 60
End: 2020-05-22

## 2020-05-22 DIAGNOSIS — E78.2 MIXED HYPERLIPIDEMIA: Primary | ICD-10-CM

## 2020-05-22 NOTE — TELEPHONE ENCOUNTER
Looks like patient had her labs drawn at an outside facility.   LDL not at goal, can we see if she is intolerant to all statins?

## 2020-05-22 NOTE — TELEPHONE ENCOUNTER
Reviewed lipids showing   Recent Labs   Lab Test 04/30/20 10/06/14  0759 06/12/14  0856   CHOL 205 158 277*   HDL 50 43* 46*    79 186*   TRIG 217 178* 226*   CHOLHDLRATIO  --  3.7 6.0*     Pt asking if she should continue the pravastatin 40 mg daily.   Will message Jennifer Reddy NP to review. Idalia GAGE

## 2020-05-22 NOTE — TELEPHONE ENCOUNTER
----- Message from Emma Hassan RN sent at 5/22/2020  8:15 AM CDT -----  Regarding: follow up  Sp Avila,   This patient called because Pharmacy said her prescription for Pravastatin was denied for refill. I looked her up and told her it is probably because she was not re-scheduled here, rather she was told to follow up with her PMD. She does not have a PMD, only a Gynecologist. She will check with her to see if she will refill the medication. She did say she had her Lipids drawn after her Pravastatin was increased from 20 mg to 40 mg but she never heard back from Jennifer Willingham whether she was supposed to keep taking that dose or not. She asked if you could call her regarding this.   Thank you,   Emma

## 2020-05-27 RX ORDER — ROSUVASTATIN CALCIUM 5 MG/1
2.5 TABLET, COATED ORAL DAILY
Qty: 45 TABLET | Refills: 1 | Status: SHIPPED | OUTPATIENT
Start: 2020-05-27 | End: 2020-12-01

## 2020-05-27 NOTE — TELEPHONE ENCOUNTER
Called re: statins she has taken.   She states the Lipitor/atorvastatin gave her really bad muscle pains & cramps.   She thinks she has taken Crestor/rosuvasttin at some time but does not remember what or if she had a reaction to it.   She states when she took the Zocor/simvastatin, she developed nodules on her thyroid.     Will message Jennifer Reddy NP to review. Idalia GAGE

## 2020-05-27 NOTE — TELEPHONE ENCOUNTER
Called pt with recommendations from Jennifer Reddy NP to stop the pravastastin & start low dose rosuvastatin at 2.5 mg daily. [facility] [place] & pt will get repeat lipid panel at Clinic Jong Friedman RN

## 2020-05-27 NOTE — TELEPHONE ENCOUNTER
LDL not at goal on high dose pravastatin.  If patient does not feel she has an intolerance to rosuvastatin, recommend low dose 2.5mg daily  Follow up fasting lipid panel in 3 months.

## 2020-08-20 ENCOUNTER — HOSPITAL ENCOUNTER (OUTPATIENT)
Dept: MAMMOGRAPHY | Facility: CLINIC | Age: 60
Discharge: HOME | End: 2020-08-20
Attending: NURSE PRACTITIONER | Admitting: NURSE PRACTITIONER
Payer: COMMERCIAL

## 2020-08-20 ENCOUNTER — TRANSFERRED RECORDS (OUTPATIENT)
Dept: HEALTH INFORMATION MANAGEMENT | Facility: CLINIC | Age: 60
End: 2020-08-20

## 2020-08-20 DIAGNOSIS — Z12.31 VISIT FOR SCREENING MAMMOGRAM: ICD-10-CM

## 2020-08-20 PROCEDURE — 77063 BREAST TOMOSYNTHESIS BI: CPT

## 2020-11-19 NOTE — TELEPHONE ENCOUNTER
Received refill request for:  Rosuvastatin   Last OV was: 12/27/2019  Labs/EKG: needs lipids  F/U scheduled: Reviewed last office visit note. Continued care deferred to PCP.   New script sent to: Rx not sent, pharmacy notified.     Nestor Antonio LPN  [facility]  [phone]

## 2020-12-01 DIAGNOSIS — E78.2 MIXED HYPERLIPIDEMIA: ICD-10-CM

## 2020-12-01 RX ORDER — ROSUVASTATIN CALCIUM 5 MG/1
2.5 TABLET, COATED ORAL DAILY
Qty: 45 TABLET | Refills: 0 | Status: SHIPPED | OUTPATIENT
Start: 2020-12-01 | End: 2020-12-07

## 2020-12-07 ENCOUNTER — OFFICE VISIT (OUTPATIENT)
Dept: CARDIOLOGY | Facility: CLINIC | Age: 60
End: 2020-12-07
Payer: COMMERCIAL

## 2020-12-07 VITALS
BODY MASS INDEX: 40.29 KG/M2 | HEIGHT: 66 IN | OXYGEN SATURATION: 96 % | HEART RATE: 94 BPM | WEIGHT: 250.7 LBS | DIASTOLIC BLOOD PRESSURE: 78 MMHG | SYSTOLIC BLOOD PRESSURE: 121 MMHG

## 2020-12-07 DIAGNOSIS — E78.2 MIXED HYPERLIPIDEMIA: Primary | ICD-10-CM

## 2020-12-07 PROCEDURE — 99213 OFFICE O/P EST LOW 20 MIN: CPT | Performed by: NURSE PRACTITIONER

## 2020-12-07 RX ORDER — ROSUVASTATIN CALCIUM 5 MG/1
5 TABLET, COATED ORAL DAILY
Qty: 90 TABLET | Refills: 3 | Status: SHIPPED | OUTPATIENT
Start: 2020-12-07 | End: 2021-02-08

## 2020-12-07 RX ORDER — MAGNESIUM 200 MG
1000 TABLET ORAL DAILY
COMMUNITY

## 2020-12-07 ASSESSMENT — MIFFLIN-ST. JEOR: SCORE: 1733.92

## 2020-12-07 NOTE — LETTER
12/7/2020    JOHAN Donaldson CNP  [facility] [address]  [facility] [address]    RE: Dominique Craft       Dear Colleague,    I had the pleasure of seeing Dominique Craft in the [facility].    Cardiology Clinic Progress Note  Dominique Craft MRN# [id number]   YOB: 1960 Age: 60 year old     Reason For Visit: Annual Follow up   Primary Cardiologist:   Dr. Sebastian          History of Presenting Illness:      Dominique Craft is a pleasant 60 year old patient who carries a past medical history significant for hyperlipidemia, statin intolerance, and borderline diabetes on metformin.  She returns to the office today for an annual follow-up.      She offers no cardiac complaint, denies chest pain, shortness of breath, PND, orthopnea, presyncope, syncope, edema, heart racing, or palpitations.  Her primary complaint is fatigue.     Blood pressure is normal at 121/78  Lipid panel (8/20/2020) demonstrated a total cholesterol of 181, triglycerides 226, HDL 45, and LDL 91, on low dose Crestor.  BMI 40.46, up 22 pounds over the last year.     Activity has reduced over the last year  Remains compliant with all medications.                   Assessment and Plan:     1. Hyperlipidemia -improved on low-dose Crestor.  Last lipid panel showed a total cholesterol of 181, triglycerides 226, HDL 45, and LDL 91.  Due to her history of borderline diabetes, now on Metformin,  I recommend her LDL closer to 70.  Increase Crestor to 5 mg daily and monitor closely for any myalgia pains.  Follow-up fasting lipid panel in 8 weeks.  Encourage exercise, weight loss, and strict heart healthy diet.                  Thank you for allowing me to participate in this delightful patient's care. I have recommended she follow up in 1 year or sooner if needed .       Jennifer Reddy, JOHAN CNP         Review of Systems:     Review of Systems:  Skin:  Negative     Eyes:  Positive  for glasses  ENT:  Negative    Respiratory:  Negative    Cardiovascular:    Positive for;fatigue  Gastroenterology: Negative    Genitourinary:  Negative    Musculoskeletal:  Negative    Neurologic:  Negative    Psychiatric:  Negative    Heme/Lymph/Imm:  Negative    Endocrine:  Negative                Physical Exam:     GEN:  In general, this is a overweight female in no acute distress.  HEENT:  Pupils equal, round. Sclerae nonicteric. Clear oropharynx. Mucous membranes moist.  NECK: Supple, no masses appreciated. Trachea midline. NoJVD   C/V:  Regular rate and rhythm, No murmur, rub or gallop. No S3 or RV heave.   RESP: Respirations are unlabored. No use of accessory muscles. Clear to auscultation bilaterally without wheezing, rales, or rhonchi.  GI: Abdomen soft, nontender, nondistended. No HSM appreciated.   EXTREM: No LE edema. No cyanosis or clubbing.  NEURO: Alert and oriented, cooperative. No obvious focal deficits.   PSYCH: Normal affect.  SKIN: Warm and dry. No rashes or petechiae appreciated.          Past Medical History:     Past Medical History:   Diagnosis Date     DVT (deep venous thrombosis) (H)     after tummy tuck in 2006, right leg     Epistaxis     ENT     Gallstones     s/p lisa     GERD (gastroesophageal reflux disease)      Hyperlipaemia      Insomnia      Nephrolithiasis      Obesity (BMI 30-39.9)      PE (pulmonary embolism)     after tummy tuch 2006     Thyroid nodules     benign and biopsied, previously on Sythroid              Past Surgical History:     Past Surgical History:   Procedure Laterality Date     APPENDECTOMY OPEN  1981     CHOLECYSTECTOMY  1993     COLONOSCOPY  3/28/2014    Procedure: COLONOSCOPY;  COLONOSCOPY (MAC SEDATION);  Surgeon: Thais Gregory MD;  Location:  GI     COSMETIC ABDOMINOPLASTY  2/2006 and 03/2007    tummy tuck and breast lift , revision of abdominoplasty in  2007     HYSTERECTOMY  2008    prophylactic due to FH of cancer              Allergies:    Amoxicillin, Naproxen, and Simvastatin       Data:   All laboratory data reviewed:    LAST CHOLESTEROL:  Lab Results   Component Value Date    CHOL 205 04/30/2020     Lab Results   Component Value Date    HDL 50 04/30/2020     Lab Results   Component Value Date     04/30/2020     Lab Results   Component Value Date    TRIG 217 04/30/2020     Lab Results   Component Value Date    CHOLHDLRATIO 3.7 10/06/2014       LAST BMP:  Lab Results   Component Value Date     09/20/2013      Lab Results   Component Value Date    POTASSIUM 4.3 09/20/2013     Lab Results   Component Value Date    CHLORIDE 105 09/20/2013     Lab Results   Component Value Date    FLEX 9.3 09/20/2013     Lab Results   Component Value Date    CO2 27 09/20/2013     Lab Results   Component Value Date    BUN 15 09/20/2013     Lab Results   Component Value Date    CR 0.85 09/20/2013     Lab Results   Component Value Date    GLC 97 09/20/2013       LAST CBC:  Lab Results   Component Value Date    WBC 6.2 03/28/2014     Lab Results   Component Value Date    RBC 4.95 03/28/2014     Lab Results   Component Value Date    HGB 14.7 03/28/2014     Lab Results   Component Value Date    HCT 44.7 03/28/2014     Lab Results   Component Value Date    MCV 90 03/28/2014     Lab Results   Component Value Date    MCH 29.7 03/28/2014     Lab Results   Component Value Date    MCHC 32.9 03/28/2014     Lab Results   Component Value Date    RDW 13.4 03/28/2014     Lab Results   Component Value Date     03/28/2014       Thank you for allowing me to participate in the care of your patient.    Sincerely,     JOHAN Chapa CNP     [facility]

## 2020-12-07 NOTE — PROGRESS NOTES
Cardiology Clinic Progress Note  Dominqiue Craft MRN# [id number]   YOB: 1960 Age: 60 year old     Reason For Visit: Annual Follow up   Primary Cardiologist:   Dr. Sebastian          History of Presenting Illness:      Dominique Craft is a pleasant 60 year old patient who carries a past medical history significant for hyperlipidemia, statin intolerance, and borderline diabetes on metformin.  She returns to the office today for an annual follow-up.      She offers no cardiac complaint, denies chest pain, shortness of breath, PND, orthopnea, presyncope, syncope, edema, heart racing, or palpitations.  Her primary complaint is fatigue.     Blood pressure is normal at 121/78  Lipid panel (8/20/2020) demonstrated a total cholesterol of 181, triglycerides 226, HDL 45, and LDL 91, on low dose Crestor.  BMI 40.46, up 22 pounds over the last year.     Activity has reduced over the last year  Remains compliant with all medications.                   Assessment and Plan:     1. Hyperlipidemia -improved on low-dose Crestor.  Last lipid panel showed a total cholesterol of 181, triglycerides 226, HDL 45, and LDL 91.  Due to her history of borderline diabetes, now on Metformin,  I recommend her LDL closer to 70.  Increase Crestor to 5 mg daily and monitor closely for any myalgia pains.  Follow-up fasting lipid panel in 8 weeks.  Encourage exercise, weight loss, and strict heart healthy diet.                  Thank you for allowing me to participate in this delightful patient's care. I have recommended she follow up in 1 year or sooner if needed .       Jennifer Reddy, JOHAN CNP         Review of Systems:     Review of Systems:  Skin:  Negative     Eyes:  Positive for glasses  ENT:  Negative    Respiratory:  Negative    Cardiovascular:    Positive for;fatigue  Gastroenterology: Negative    Genitourinary:  Negative    Musculoskeletal:  Negative    Neurologic:  Negative    Psychiatric:  Negative    Heme/Lymph/Imm:   Negative    Endocrine:  Negative                Physical Exam:     GEN:  In general, this is a overweight female in no acute distress.  HEENT:  Pupils equal, round. Sclerae nonicteric. Clear oropharynx. Mucous membranes moist.  NECK: Supple, no masses appreciated. Trachea midline. NoJVD   C/V:  Regular rate and rhythm, No murmur, rub or gallop. No S3 or RV heave.   RESP: Respirations are unlabored. No use of accessory muscles. Clear to auscultation bilaterally without wheezing, rales, or rhonchi.  GI: Abdomen soft, nontender, nondistended. No HSM appreciated.   EXTREM: No LE edema. No cyanosis or clubbing.  NEURO: Alert and oriented, cooperative. No obvious focal deficits.   PSYCH: Normal affect.  SKIN: Warm and dry. No rashes or petechiae appreciated.          Past Medical History:     Past Medical History:   Diagnosis Date     DVT (deep venous thrombosis) (H)     after tummy tuck in 2006, right leg     Epistaxis     ENT     Gallstones     s/p lisa     GERD (gastroesophageal reflux disease)      Hyperlipaemia      Insomnia      Nephrolithiasis      Obesity (BMI 30-39.9)      PE (pulmonary embolism)     after tummy tuch 2006     Thyroid nodules     benign and biopsied, previously on Sythroid              Past Surgical History:     Past Surgical History:   Procedure Laterality Date     APPENDECTOMY OPEN  1981     CHOLECYSTECTOMY  1993     COLONOSCOPY  3/28/2014    Procedure: COLONOSCOPY;  COLONOSCOPY (MAC SEDATION);  Surgeon: Thais Gregory MD;  Location:  GI     COSMETIC ABDOMINOPLASTY  2/2006 and 03/2007    tummy tuck and breast lift , revision of abdominoplasty in  2007     HYSTERECTOMY  2008    prophylactic due to FH of cancer              Allergies:   Amoxicillin, Naproxen, and Simvastatin       Data:   All laboratory data reviewed:    LAST CHOLESTEROL:  Lab Results   Component Value Date    CHOL 205 04/30/2020     Lab Results   Component Value Date    HDL 50 04/30/2020     Lab Results   Component Value  Date     04/30/2020     Lab Results   Component Value Date    TRIG 217 04/30/2020     Lab Results   Component Value Date    CHOLHDLRATIO 3.7 10/06/2014       LAST BMP:  Lab Results   Component Value Date     09/20/2013      Lab Results   Component Value Date    POTASSIUM 4.3 09/20/2013     Lab Results   Component Value Date    CHLORIDE 105 09/20/2013     Lab Results   Component Value Date    FLEX 9.3 09/20/2013     Lab Results   Component Value Date    CO2 27 09/20/2013     Lab Results   Component Value Date    BUN 15 09/20/2013     Lab Results   Component Value Date    CR 0.85 09/20/2013     Lab Results   Component Value Date    GLC 97 09/20/2013       LAST CBC:  Lab Results   Component Value Date    WBC 6.2 03/28/2014     Lab Results   Component Value Date    RBC 4.95 03/28/2014     Lab Results   Component Value Date    HGB 14.7 03/28/2014     Lab Results   Component Value Date    HCT 44.7 03/28/2014     Lab Results   Component Value Date    MCV 90 03/28/2014     Lab Results   Component Value Date    MCH 29.7 03/28/2014     Lab Results   Component Value Date    MCHC 32.9 03/28/2014     Lab Results   Component Value Date    RDW 13.4 03/28/2014     Lab Results   Component Value Date     03/28/2014

## 2020-12-07 NOTE — PATIENT INSTRUCTIONS
Thanks for participating in a office visit with the [facility] today.    Reviewed results of last cholesterol panel.  LDL not at goal due to risk factors.   Recommend increasing rosuvastatin to 5mg daily  Follow up cholesterol level in 8 weeks   Encourage exercise, weight loss, and heart healthy diet.     Follow up in 1 year     Please call my nurse at [phone] with any questions or concerns.    Scheduling phone number: [phone]  Reminder: Please bring in all current medications, over the counter supplements and vitamin bottles to your next appointment.

## 2021-02-02 ENCOUNTER — TRANSFERRED RECORDS (OUTPATIENT)
Dept: HEALTH INFORMATION MANAGEMENT | Facility: CLINIC | Age: 61
End: 2021-02-02

## 2021-02-02 LAB
ALT SERPL-CCNC: 22 U/L
AST SERPL-CCNC: 11 U/L
CHOL/HDLC RATIO - QUEST: 3.3
CHOLEST SERPL-MCNC: 163 MG/DL
HDLC SERPL-MCNC: 49 MG/DL
LDLC SERPL CALC-MCNC: 81 MG/DL
NONHDLC SERPL-MCNC: NORMAL MG/DL
TRIGL SERPL-MCNC: 165 MG/DL

## 2021-02-05 ENCOUNTER — DOCUMENTATION ONLY (OUTPATIENT)
Dept: CARDIOLOGY | Facility: CLINIC | Age: 61
End: 2021-02-05

## 2021-02-05 ENCOUNTER — TELEPHONE (OUTPATIENT)
Dept: CARDIOLOGY | Facility: CLINIC | Age: 61
End: 2021-02-05

## 2021-02-05 DIAGNOSIS — E78.2 MIXED HYPERLIPIDEMIA: ICD-10-CM

## 2021-02-05 NOTE — TELEPHONE ENCOUNTER
Pt called back, informed of results. She is taking rosuvastatin 5 mg daily without complications. She follows a low carbs diet & states she eats plenty of fresh fruits & vegetables. She is exercising by walking & tries to get 10,000 steps daily. Will message Jennifer Reddy NP to review. Idalia GAGE

## 2021-02-05 NOTE — TELEPHONE ENCOUNTER
Lipid panel shows overall improvement. LDL 81.   Patient carries a history of myalgias with statins, however tolerating 5 mg well.   If willing, would recommend increasing crestor to 10 mg daily. Monitor closely for myalgia pain.   Follow up FLP in 8 weeks.

## 2021-02-08 RX ORDER — ROSUVASTATIN CALCIUM 10 MG/1
10 TABLET, COATED ORAL DAILY
Qty: 90 TABLET | Refills: 1 | Status: SHIPPED | OUTPATIENT
Start: 2021-02-08 | End: 2021-06-30

## 2021-02-08 NOTE — TELEPHONE ENCOUNTER
RN called patient and updated her with the lab results and SAMIA Jennifer's recommendations. Patient verbalized understanding and is in agreement with plan. RN updated patient's med list and sent in new rx. RN placed orders for FLP in 2 months and also advised patient to call us to provide update if she notices any myalgias after starting increased dose. Patient verbalized understanding and is in agreement with plan.

## 2021-04-07 ENCOUNTER — TRANSFERRED RECORDS (OUTPATIENT)
Dept: HEALTH INFORMATION MANAGEMENT | Facility: CLINIC | Age: 61
End: 2021-04-07

## 2021-04-07 LAB
ALT SERPL-CCNC: 28 U/L
AST SERPL-CCNC: 20 U/L
CHOLEST SERPL-MCNC: 162 MG/DL
HDLC SERPL-MCNC: 47 MG/DL
LDLC SERPL CALC-MCNC: 82 MG/DL
NONHDLC SERPL-MCNC: NORMAL MG/DL
TRIGL SERPL-MCNC: 166 MG/DL

## 2021-06-29 ENCOUNTER — TELEPHONE (OUTPATIENT)
Dept: CARDIOLOGY | Facility: CLINIC | Age: 61
End: 2021-06-29

## 2021-06-29 DIAGNOSIS — E78.2 MIXED HYPERLIPIDEMIA: ICD-10-CM

## 2021-06-29 NOTE — TELEPHONE ENCOUNTER
Pt called - had lipid profile 04/2021  LDL= 82  Pt had increased atorvastatin to 10mg daily in Feb 2021 per Nicole's recommendations.  Denies gabi.    Requesting further recommendations.  Will review with Nicole NP

## 2021-06-30 RX ORDER — ROSUVASTATIN CALCIUM 20 MG/1
20 TABLET, COATED ORAL DAILY
Qty: 90 TABLET | Refills: 1 | Status: SHIPPED | OUTPATIENT
Start: 2021-06-30 | End: 2021-12-23

## 2021-06-30 NOTE — TELEPHONE ENCOUNTER
Called pt - increase rosuvastatin to 20mg - call with myalgias. (NOTE**PT on rosuvastatin not atorvastatin as inadvertantly listed )  Repeat lipid profile in 8 weeks.  Pt will have labs at her PCP

## 2021-06-30 NOTE — TELEPHONE ENCOUNTER
Recommend increasing atorvastatin to 20 mg daily.   Monitor closely for myalgia pain.   Repeat fasting lipid panel in 8 weeks.

## 2021-08-31 ENCOUNTER — TRANSFERRED RECORDS (OUTPATIENT)
Dept: HEALTH INFORMATION MANAGEMENT | Facility: CLINIC | Age: 61
End: 2021-08-31

## 2021-08-31 ENCOUNTER — HOSPITAL ENCOUNTER (OUTPATIENT)
Dept: MAMMOGRAPHY | Facility: CLINIC | Age: 61
Discharge: HOME | End: 2021-08-31
Attending: NURSE PRACTITIONER | Admitting: NURSE PRACTITIONER
Payer: COMMERCIAL

## 2021-08-31 ENCOUNTER — APPOINTMENT (OUTPATIENT)
Dept: URBAN - METROPOLITAN AREA CLINIC 255 | Age: 61
Setting detail: DERMATOLOGY
End: 2021-09-01

## 2021-08-31 DIAGNOSIS — L71.8 OTHER ROSACEA: ICD-10-CM

## 2021-08-31 DIAGNOSIS — Z12.31 VISIT FOR SCREENING MAMMOGRAM: ICD-10-CM

## 2021-08-31 LAB
ALT SERPL-CCNC: 27 IU/L (ref 12–68)
AST SERPL-CCNC: 15 IU/L (ref 12–37)
CHOLESTEROL (EXTERNAL): 141 MG/DL
HDLC SERPL-MCNC: 51 MG/DL
LDL CHOLESTEROL CALCULATED (EXTERNAL): 63 MG/DL
TRIGLYCERIDES (EXTERNAL): 137 MG/DL

## 2021-08-31 PROCEDURE — OTHER PRESCRIPTION: OTHER

## 2021-08-31 PROCEDURE — 99203 OFFICE O/P NEW LOW 30 MIN: CPT

## 2021-08-31 PROCEDURE — 77063 BREAST TOMOSYNTHESIS BI: CPT

## 2021-08-31 PROCEDURE — OTHER MIPS QUALITY: OTHER

## 2021-08-31 PROCEDURE — OTHER COUNSELING: OTHER

## 2021-08-31 RX ORDER — METRONIDAZOLE 7.5 MG/G
CREAM TOPICAL
Qty: 45 | Refills: 5 | Status: ERX | COMMUNITY
Start: 2021-08-31

## 2021-08-31 NOTE — PROCEDURE: COUNSELING
Patient Specific Counseling (Will Not Stick From Patient To Patient): Recommend laser treatments to treat telangiectasia, and metronidazole to treat the papules.
Detail Level: Detailed

## 2021-08-31 NOTE — HPI: RASH (ROSACEA)
How Severe Is Your Rosacea?: moderate
Is This A New Presentation, Or A Follow-Up?: Rosacea
Additional History: History of laser treatments in the past to treat telangictagia.

## 2021-09-01 NOTE — TELEPHONE ENCOUNTER
Called pt with lipid results & recommendations from Jennifer Reddy NP. Pt verbalized understanding. Idalia GAGE

## 2021-09-01 NOTE — TELEPHONE ENCOUNTER
Received lipids showing    Will message Jennifer Reddy NP to review & fax labs to be entered. Idalia GAGE

## 2021-12-23 ENCOUNTER — OFFICE VISIT (OUTPATIENT)
Dept: CARDIOLOGY | Facility: CLINIC | Age: 61
End: 2021-12-23
Payer: COMMERCIAL

## 2021-12-23 VITALS
HEIGHT: 66 IN | HEART RATE: 82 BPM | SYSTOLIC BLOOD PRESSURE: 122 MMHG | OXYGEN SATURATION: 97 % | DIASTOLIC BLOOD PRESSURE: 70 MMHG | BODY MASS INDEX: 34.39 KG/M2 | WEIGHT: 214 LBS

## 2021-12-23 DIAGNOSIS — E78.2 MIXED HYPERLIPIDEMIA: ICD-10-CM

## 2021-12-23 PROCEDURE — 99213 OFFICE O/P EST LOW 20 MIN: CPT | Performed by: NURSE PRACTITIONER

## 2021-12-23 RX ORDER — ROSUVASTATIN CALCIUM 20 MG/1
20 TABLET, COATED ORAL DAILY
Qty: 90 TABLET | Refills: 3 | Status: SHIPPED | OUTPATIENT
Start: 2021-12-23 | End: 2022-09-14

## 2021-12-23 ASSESSMENT — MIFFLIN-ST. JEOR: SCORE: 1562.45

## 2021-12-23 NOTE — PATIENT INSTRUCTIONS
Thanks for participating in a office visit with the [facility] clinic today.    Doing well  Recent lipid panel is excellent.  Continue on current dose of Rosuvastatin.   Encourage continued exercise, weight loss, and heart healthy diet.     Follow up in 1 year with Dr. Sebastian    Please call my nurse at [phone] with any questions or concerns.    Scheduling phone number: [phone]  Reminder: Please bring in all current medications, over the counter supplements and vitamin bottles to your next appointment.

## 2021-12-23 NOTE — LETTER
12/23/2021    JOHAN Donaldson CNP  [facility] [address]  [facility] [address]    RE: Dominique Craft       Dear Colleague,     I had the pleasure of seeing Dominique Craft in the [facility].  Cardiology Clinic Progress Note  Dominique Craft MRN# [id number]   YOB: 1960 Age: 60 year old     Reason For Visit: Annual Follow up   Primary Cardiologist:   Dr. Sebastian          History of Presenting Illness:      Dominique Craft is a pleasant 61 year old patient who carries a past medical history significant for hyperlipidemia, statin intolerance, and borderline diabetes on metformin.  She returns to the office today for an annual follow-up.      She offers no cardiac complaint, denies chest pain, shortness of breath, PND, orthopnea, presyncope, syncope, edema, heart racing, or palpitations.  Over the last year she has been very aggressive with her exercise routine and diet.  She has eliminated sugar and walks approximately 10-12,000 steps daily.  She has lost approximately 35 pounds.  Her only complaint is a mild elevation in her A1c.  She plans to follow with her PCP for further management.    Blood pressure is well controlled at 122/70  Last lipid panel showed a total cholesterol of 141, HDL 51, LDL 63, and triglycerides 137, on rosuvastatin.  She denies any intolerance to rosuvastatin.    Remains compliant with all medications.                   Assessment and Plan:     1. Hyperlipidemia -LDL at goal 63, continue on current dose of rosuvastatin.     2.  Borderline diabetes -on Metformin         Thank you for allowing me to participate in this delightful patient's care. I have recommended she follow up in 1 year with Dr. Sebastian or sooner if needed .       Jennifer Reddy, JOHAN CNP         Review of Systems:     Review of Systems:  Skin:  Negative     Eyes:  Negative    ENT:  Negative    Respiratory:  Negative    Cardiovascular:  Negative     Gastroenterology: Negative    Genitourinary:  Negative    Musculoskeletal:  Negative    Neurologic:  Negative    Psychiatric:  Negative    Heme/Lymph/Imm:  Positive for allergies  Endocrine:    diabetes              Physical Exam:     GEN:  In general, this is a overweight female in no acute distress.  HEENT:  Pupils equal, round. Sclerae nonicteric. Clear oropharynx. Mucous membranes moist.  NECK: Supple, no masses appreciated. Trachea midline. NoJVD   C/V:  Regular rate and rhythm, No murmur, rub or gallop. No S3 or RV heave.   RESP: Respirations are unlabored. No use of accessory muscles. Clear to auscultation bilaterally without wheezing, rales, or rhonchi.  GI: Abdomen soft, nontender, nondistended. No HSM appreciated.   EXTREM: No LE edema. No cyanosis or clubbing.  NEURO: Alert and oriented, cooperative. No obvious focal deficits.   PSYCH: Normal affect.  SKIN: Warm and dry. No rashes or petechiae appreciated.          Past Medical History:     Past Medical History:   Diagnosis Date     DVT (deep venous thrombosis) (H)     after tummy tuck in 2006, right leg     Epistaxis     ENT     Gallstones     s/p lisa     GERD (gastroesophageal reflux disease)      Hyperlipaemia      Insomnia      Nephrolithiasis      Obesity (BMI 30-39.9)      PE (pulmonary embolism)     after tummy tuch 2006     Thyroid nodules     benign and biopsied, previously on Sythroid              Past Surgical History:     Past Surgical History:   Procedure Laterality Date     APPENDECTOMY OPEN  1981     CHOLECYSTECTOMY  1993     COLONOSCOPY  3/28/2014    Procedure: COLONOSCOPY;  COLONOSCOPY (MAC SEDATION);  Surgeon: Thais Gregory MD;  Location:  GI     COSMETIC ABDOMINOPLASTY  2/2006 and 03/2007    tummy tuck and breast lift , revision of abdominoplasty in  2007     HYSTERECTOMY  2008    prophylactic due to FH of cancer              Allergies:   Amoxicillin, Naproxen, and Simvastatin       Data:   All laboratory data  reviewed:       0 Result Notes     1  Topic      Ref Range & Units 3 mo ago    Cholesterol (External) <200 mg/dL 141     Triglycerides (External) <150 mg/dL 137     HDL Cholesterol (External) >40 mg/dL 51     LDL Cholesterol Calculated (External) <100 mg/dL 63    Resulting Agency  [facility]             Narrative  Performed by: [facility]  LAB RESULTS [facility]      Specimen Collected: 08/31/21  8:04 AM Last Resulted: 08/31/21                      JOHAN Chapa [facility]

## 2021-12-23 NOTE — PROGRESS NOTES
Cardiology Clinic Progress Note  Dominique Craft MRN# [id number]   YOB: 1960 Age: 60 year old     Reason For Visit: Annual Follow up   Primary Cardiologist:   Dr. Sebastian          History of Presenting Illness:      Dominique Craft is a pleasant 61 year old patient who carries a past medical history significant for hyperlipidemia, statin intolerance, and borderline diabetes on metformin.  She returns to the office today for an annual follow-up.      She offers no cardiac complaint, denies chest pain, shortness of breath, PND, orthopnea, presyncope, syncope, edema, heart racing, or palpitations.  Over the last year she has been very aggressive with her exercise routine and diet.  She has eliminated sugar and walks approximately 10-12,000 steps daily.  She has lost approximately 35 pounds.  Her only complaint is a mild elevation in her A1c.  She plans to follow with her PCP for further management.    Blood pressure is well controlled at 122/70  Last lipid panel showed a total cholesterol of 141, HDL 51, LDL 63, and triglycerides 137, on rosuvastatin.  She denies any intolerance to rosuvastatin.    Remains compliant with all medications.                   Assessment and Plan:     1. Hyperlipidemia -LDL at goal 63, continue on current dose of rosuvastatin.     2.  Borderline diabetes -on Metformin         Thank you for allowing me to participate in this delightful patient's care. I have recommended she follow up in 1 year with Dr. Sebastian or sooner if needed .       JOHAN Chapa CNP         Review of Systems:     Review of Systems:  Skin:  Negative     Eyes:  Negative    ENT:  Negative    Respiratory:  Negative    Cardiovascular:  Negative    Gastroenterology: Negative    Genitourinary:  Negative    Musculoskeletal:  Negative    Neurologic:  Negative    Psychiatric:  Negative    Heme/Lymph/Imm:  Positive for allergies  Endocrine:    diabetes              Physical Exam:     GEN:  In general,  this is a overweight female in no acute distress.  HEENT:  Pupils equal, round. Sclerae nonicteric. Clear oropharynx. Mucous membranes moist.  NECK: Supple, no masses appreciated. Trachea midline. NoJVD   C/V:  Regular rate and rhythm, No murmur, rub or gallop. No S3 or RV heave.   RESP: Respirations are unlabored. No use of accessory muscles. Clear to auscultation bilaterally without wheezing, rales, or rhonchi.  GI: Abdomen soft, nontender, nondistended. No HSM appreciated.   EXTREM: No LE edema. No cyanosis or clubbing.  NEURO: Alert and oriented, cooperative. No obvious focal deficits.   PSYCH: Normal affect.  SKIN: Warm and dry. No rashes or petechiae appreciated.          Past Medical History:     Past Medical History:   Diagnosis Date     DVT (deep venous thrombosis) (H)     after tummy tuck in 2006, right leg     Epistaxis     ENT     Gallstones     s/p lisa     GERD (gastroesophageal reflux disease)      Hyperlipaemia      Insomnia      Nephrolithiasis      Obesity (BMI 30-39.9)      PE (pulmonary embolism)     after tummy tuch 2006     Thyroid nodules     benign and biopsied, previously on Sythroid              Past Surgical History:     Past Surgical History:   Procedure Laterality Date     APPENDECTOMY OPEN  1981     CHOLECYSTECTOMY  1993     COLONOSCOPY  3/28/2014    Procedure: COLONOSCOPY;  COLONOSCOPY (MAC SEDATION);  Surgeon: Thais Gregory MD;  Location:  GI     COSMETIC ABDOMINOPLASTY  2/2006 and 03/2007    tummy tuck and breast lift , revision of abdominoplasty in  2007     HYSTERECTOMY  2008    prophylactic due to FH of cancer              Allergies:   Amoxicillin, Naproxen, and Simvastatin       Data:   All laboratory data reviewed:       0 Result Notes     1 HM Topic      Ref Range & Units 3 mo ago    Cholesterol (External) <200 mg/dL 141     Triglycerides (External) <150 mg/dL 137     HDL Cholesterol (External) >40 mg/dL 51     LDL Cholesterol Calculated (External) <100 mg/dL 63     Resulting Agency  [facility]             Narrative  Performed by: [facility]ACEE [facility]BAL  LAB RESULTS [facility]      Specimen Collected: 08/31/21  8:04 AM Last Resulted: 08/31/21

## 2021-12-28 ENCOUNTER — APPOINTMENT (OUTPATIENT)
Dept: URBAN - METROPOLITAN AREA CLINIC 255 | Age: 61
Setting detail: DERMATOLOGY
End: 2021-12-29

## 2021-12-28 VITALS — HEIGHT: 66 IN | WEIGHT: 214 LBS

## 2021-12-28 DIAGNOSIS — Z41.9 ENCOUNTER FOR PROCEDURE FOR PURPOSES OTHER THAN REMEDYING HEALTH STATE, UNSPECIFIED: ICD-10-CM

## 2021-12-28 DIAGNOSIS — L71.8 OTHER ROSACEA: ICD-10-CM

## 2021-12-28 DIAGNOSIS — L57.0 ACTINIC KERATOSIS: ICD-10-CM

## 2021-12-28 PROCEDURE — OTHER PULSED-DYE LASER: OTHER

## 2021-12-28 PROCEDURE — 17000 DESTRUCT PREMALG LESION: CPT

## 2021-12-28 PROCEDURE — OTHER COUNSELING: OTHER

## 2021-12-28 PROCEDURE — OTHER LIQUID NITROGEN: OTHER

## 2021-12-28 PROCEDURE — 99213 OFFICE O/P EST LOW 20 MIN: CPT | Mod: 25

## 2021-12-28 PROCEDURE — OTHER PRESCRIPTION: OTHER

## 2021-12-28 RX ORDER — METRONIDAZOLE 7.5 MG/G
0.75% CREAM TOPICAL BID
Qty: 45 | Refills: 5 | Status: ERX

## 2021-12-28 ASSESSMENT — LOCATION DETAILED DESCRIPTION DERM: LOCATION DETAILED: NASAL TIP

## 2021-12-28 ASSESSMENT — LOCATION SIMPLE DESCRIPTION DERM: LOCATION SIMPLE: NOSE

## 2021-12-28 ASSESSMENT — LOCATION ZONE DERM: LOCATION ZONE: NOSE

## 2021-12-28 NOTE — PROCEDURE: PULSED-DYE LASER
Price (Use Numbers Only, No Special Characters Or $): 596 Price (Use Numbers Only, No Special Characters Or $): 812

## 2021-12-28 NOTE — PROCEDURE: LIQUID NITROGEN
Post-Care Instructions: - Patient was instructed to avoid picking at any of the treated lesions.
Duration Of Freeze Thaw-Cycle (Seconds): 0
Consent: - Discussed that these are a result of cumulative sun exposure.\\n- Verbal and written consent was obtained, and risks were reviewed prior to procedure today. \\n- Risks discussed include but are not limited to pain, crusting, scabbing, blistering, scarring, temporary or permanent darker or lighter pigmentary change, recurrence, incomplete resolution, and infection.
Render Note In Bullet Format When Appropriate: Yes
Detail Level: Detailed

## 2022-02-01 ENCOUNTER — APPOINTMENT (OUTPATIENT)
Dept: URBAN - METROPOLITAN AREA CLINIC 255 | Age: 62
Setting detail: DERMATOLOGY
End: 2022-02-02

## 2022-02-01 VITALS — HEIGHT: 66 IN | WEIGHT: 214 LBS

## 2022-02-01 DIAGNOSIS — L57.0 ACTINIC KERATOSIS: ICD-10-CM

## 2022-02-01 DIAGNOSIS — L71.8 OTHER ROSACEA: ICD-10-CM

## 2022-02-01 DIAGNOSIS — Z41.9 ENCOUNTER FOR PROCEDURE FOR PURPOSES OTHER THAN REMEDYING HEALTH STATE, UNSPECIFIED: ICD-10-CM

## 2022-02-01 PROCEDURE — OTHER COUNSELING: OTHER

## 2022-02-01 PROCEDURE — OTHER PRESCRIPTION: OTHER

## 2022-02-01 PROCEDURE — 99214 OFFICE O/P EST MOD 30 MIN: CPT

## 2022-02-01 PROCEDURE — OTHER PULSED-DYE LASER: OTHER

## 2022-02-01 RX ORDER — METRONIDAZOLE 7.5 MG/G
0.75% CREAM TOPICAL BID
Qty: 45 | Refills: 5 | Status: ERX

## 2022-02-01 ASSESSMENT — LOCATION ZONE DERM: LOCATION ZONE: NOSE

## 2022-02-01 ASSESSMENT — LOCATION DETAILED DESCRIPTION DERM: LOCATION DETAILED: NASAL TIP

## 2022-02-01 ASSESSMENT — LOCATION SIMPLE DESCRIPTION DERM: LOCATION SIMPLE: NOSE

## 2022-02-01 NOTE — PROCEDURE: PULSED-DYE LASER
Price (Use Numbers Only, No Special Characters Or $): 927 Price (Use Numbers Only, No Special Characters Or $): 847

## 2022-04-07 ENCOUNTER — HOSPITAL ENCOUNTER (OUTPATIENT)
Dept: MAMMOGRAPHY | Facility: CLINIC | Age: 62
Discharge: HOME | End: 2022-04-07
Payer: COMMERCIAL

## 2022-04-07 DIAGNOSIS — N64.59 BREAST THICKENING: ICD-10-CM

## 2022-04-07 DIAGNOSIS — N64.4 BREAST PAIN, LEFT: ICD-10-CM

## 2022-04-07 PROCEDURE — 77061 BREAST TOMOSYNTHESIS UNI: CPT | Mod: LT

## 2022-04-07 PROCEDURE — 76642 ULTRASOUND BREAST LIMITED: CPT | Mod: LT

## 2022-06-10 ENCOUNTER — OFFICE VISIT (OUTPATIENT)
Dept: FAMILY MEDICINE | Facility: CLINIC | Age: 62
End: 2022-06-10
Payer: COMMERCIAL

## 2022-06-10 VITALS
HEART RATE: 85 BPM | OXYGEN SATURATION: 100 % | DIASTOLIC BLOOD PRESSURE: 78 MMHG | HEIGHT: 66 IN | RESPIRATION RATE: 16 BRPM | BODY MASS INDEX: 29.41 KG/M2 | WEIGHT: 183 LBS | TEMPERATURE: 98.1 F | SYSTOLIC BLOOD PRESSURE: 123 MMHG

## 2022-06-10 DIAGNOSIS — E66.9 OBESITY (BMI 30-39.9): ICD-10-CM

## 2022-06-10 DIAGNOSIS — E78.2 MIXED HYPERLIPIDEMIA: Primary | ICD-10-CM

## 2022-06-10 DIAGNOSIS — R73.01 IMPAIRED FASTING GLUCOSE: ICD-10-CM

## 2022-06-10 DIAGNOSIS — E04.1 THYROID NODULE: ICD-10-CM

## 2022-06-10 LAB
ALBUMIN SERPL-MCNC: 3.9 G/DL (ref 3.4–5)
ALP SERPL-CCNC: 79 U/L (ref 40–150)
ALT SERPL W P-5'-P-CCNC: 31 U/L (ref 0–50)
ANION GAP SERPL CALCULATED.3IONS-SCNC: 4 MMOL/L (ref 3–14)
AST SERPL W P-5'-P-CCNC: 21 U/L (ref 0–45)
BASOPHILS # BLD AUTO: 0 10E3/UL (ref 0–0.2)
BASOPHILS NFR BLD AUTO: 1 %
BILIRUB SERPL-MCNC: 0.3 MG/DL (ref 0.2–1.3)
BUN SERPL-MCNC: 19 MG/DL (ref 7–30)
CALCIUM SERPL-MCNC: 9.2 MG/DL (ref 8.5–10.1)
CHLORIDE BLD-SCNC: 108 MMOL/L (ref 94–109)
CHOLEST SERPL-MCNC: 119 MG/DL
CO2 SERPL-SCNC: 29 MMOL/L (ref 20–32)
CREAT SERPL-MCNC: 0.78 MG/DL (ref 0.52–1.04)
EOSINOPHIL # BLD AUTO: 0.1 10E3/UL (ref 0–0.7)
EOSINOPHIL NFR BLD AUTO: 1 %
ERYTHROCYTE [DISTWIDTH] IN BLOOD BY AUTOMATED COUNT: 13.6 % (ref 10–15)
FASTING STATUS PATIENT QL REPORTED: YES
GFR SERPL CREATININE-BSD FRML MDRD: 87 ML/MIN/1.73M2
GLUCOSE BLD-MCNC: 95 MG/DL (ref 70–99)
HBA1C MFR BLD: 5.5 % (ref 0–5.6)
HCT VFR BLD AUTO: 46.6 % (ref 35–47)
HDLC SERPL-MCNC: 50 MG/DL
HGB BLD-MCNC: 14.7 G/DL (ref 11.7–15.7)
IMM GRANULOCYTES # BLD: 0 10E3/UL
IMM GRANULOCYTES NFR BLD: 0 %
LDLC SERPL CALC-MCNC: 55 MG/DL
LYMPHOCYTES # BLD AUTO: 2 10E3/UL (ref 0.8–5.3)
LYMPHOCYTES NFR BLD AUTO: 31 %
MCH RBC QN AUTO: 29.6 PG (ref 26.5–33)
MCHC RBC AUTO-ENTMCNC: 31.5 G/DL (ref 31.5–36.5)
MCV RBC AUTO: 94 FL (ref 78–100)
MONOCYTES # BLD AUTO: 0.6 10E3/UL (ref 0–1.3)
MONOCYTES NFR BLD AUTO: 10 %
NEUTROPHILS # BLD AUTO: 3.8 10E3/UL (ref 1.6–8.3)
NEUTROPHILS NFR BLD AUTO: 58 %
NONHDLC SERPL-MCNC: 69 MG/DL
PLATELET # BLD AUTO: 245 10E3/UL (ref 150–450)
POTASSIUM BLD-SCNC: 4.1 MMOL/L (ref 3.4–5.3)
PROT SERPL-MCNC: 7 G/DL (ref 6.8–8.8)
RBC # BLD AUTO: 4.96 10E6/UL (ref 3.8–5.2)
SODIUM SERPL-SCNC: 141 MMOL/L (ref 133–144)
TRIGL SERPL-MCNC: 72 MG/DL
TSH SERPL DL<=0.005 MIU/L-ACNC: 0.66 MU/L (ref 0.4–4)
WBC # BLD AUTO: 6.5 10E3/UL (ref 4–11)

## 2022-06-10 PROCEDURE — 80061 LIPID PANEL: CPT | Performed by: INTERNAL MEDICINE

## 2022-06-10 PROCEDURE — 80050 GENERAL HEALTH PANEL: CPT | Performed by: INTERNAL MEDICINE

## 2022-06-10 PROCEDURE — 36415 COLL VENOUS BLD VENIPUNCTURE: CPT | Performed by: INTERNAL MEDICINE

## 2022-06-10 PROCEDURE — 83036 HEMOGLOBIN GLYCOSYLATED A1C: CPT | Performed by: INTERNAL MEDICINE

## 2022-06-10 PROCEDURE — 99204 OFFICE O/P NEW MOD 45 MIN: CPT | Performed by: INTERNAL MEDICINE

## 2022-06-10 ASSESSMENT — PAIN SCALES - GENERAL: PAINLEVEL: NO PAIN (0)

## 2022-06-10 NOTE — PROGRESS NOTES
"  Assessment & Plan     Mixed hyperlipidemia  On statin, update labs  - CBC with Platelets & Differential  - Comprehensive metabolic panel  - Lipid panel reflex to direct LDL Fasting    Impaired fasting glucose  Update a1c and will consider adjustments based on result  - Hemoglobin A1c  - metFORMIN (GLUCOPHAGE) 500 MG tablet  Dispense: 90 tablet; Refill: 1    Obesity (BMI 30-39.9)  Has been making excellent efforts toward weight loss and healthy lifestyle    Thyroid nodules  Update US, refer to specialist if indicated  - US Thyroid  - TSH with free T4 reflex    Return in about 6 months (around 12/10/2022) for Follow up, with me, in person, sooner if symptoms worsen or do not improve.    DO THIAGO Rojas [facility] FARIDA Dickens is a 61 year old who presents for the following health issues     HPI     Establish care    Chrissie presents to establish care  Specialists: cardio    Chrissie is on rosuvastatin. [facility] worked closely with cardio to taper up from low dose to current dose and tolerates well, no myalgias.  historically was on simvastatin which caused significant SE and caused thyroid nodules per patient.  had to have three biopsied and was supposed to follow-up with ENT again but did not.  Thinks they resolved but unsure.    She states she was placed on metformin for weight/elevated sugars, not diabetic but prediabetic. [facility] a1c has been at 5.4 for many years and inquires about tapering off metformin. Lost 60 pounds. She is currently on BID dosing.    Review of Systems   Constitutional, HEENT, cardiovascular, pulmonary, gi and gu systems are negative, except as otherwise noted.      Objective    /78 (BP Location: Left arm, Patient Position: Sitting, Cuff Size: Adult Regular)   Pulse 85   Temp 98.1  F (36.7  C)   Resp 16   Ht 1.676 m (5' 6\")   Wt 83 kg (183 lb)   SpO2 100%   BMI 29.54 kg/m    Body mass index is 29.54 kg/m .  Physical Exam     GENERAL " APPEARANCE: AAOx3, no distress. Well developed.    PSYCH: appropriate mood and affect.

## 2022-06-13 ENCOUNTER — TELEPHONE (OUTPATIENT)
Dept: FAMILY MEDICINE | Facility: CLINIC | Age: 62
End: 2022-06-13
Payer: COMMERCIAL

## 2022-06-13 NOTE — TELEPHONE ENCOUNTER
Writer called patient, reviewed provider recommendation below. Patient expressed verbal understanding.   Writer scheduled patient for follow up appointment  9/14/2022 7:00 AM (Arrive by 6:45 AM) Camryn Graham DO [facility] RN  [facility]

## 2022-06-13 NOTE — TELEPHONE ENCOUNTER
----- Message from Camryn Graham DO sent at 6/13/2022  7:54 AM CDT -----  Please call Chrissie:  Thyroid function is in normal range  Cholesterol is well controlled  Liver function is normal  Her prediabetes is well controlled on metformin BID. However, as discussed, let's trial taking 500mg metformin with dinner only going forward due to her desire to taper down. Follow-up with me in three-four months.    Thanks,  Dr Graham

## 2022-06-16 ENCOUNTER — ANCILLARY PROCEDURE (OUTPATIENT)
Dept: ULTRASOUND IMAGING | Facility: CLINIC | Age: 62
End: 2022-06-16
Attending: INTERNAL MEDICINE
Payer: COMMERCIAL

## 2022-06-16 DIAGNOSIS — E04.1 THYROID NODULE: ICD-10-CM

## 2022-06-16 PROCEDURE — 76536 US EXAM OF HEAD AND NECK: CPT

## 2022-09-14 ENCOUNTER — OFFICE VISIT (OUTPATIENT)
Dept: FAMILY MEDICINE | Facility: CLINIC | Age: 62
End: 2022-09-14
Payer: COMMERCIAL

## 2022-09-14 ENCOUNTER — HOSPITAL ENCOUNTER (OUTPATIENT)
Dept: MAMMOGRAPHY | Facility: CLINIC | Age: 62
Discharge: HOME | End: 2022-09-14
Attending: NURSE PRACTITIONER | Admitting: NURSE PRACTITIONER
Payer: COMMERCIAL

## 2022-09-14 ENCOUNTER — TELEPHONE (OUTPATIENT)
Dept: FAMILY MEDICINE | Facility: CLINIC | Age: 62
End: 2022-09-14

## 2022-09-14 VITALS
HEIGHT: 66 IN | DIASTOLIC BLOOD PRESSURE: 78 MMHG | HEART RATE: 76 BPM | SYSTOLIC BLOOD PRESSURE: 114 MMHG | BODY MASS INDEX: 27.77 KG/M2 | WEIGHT: 172.8 LBS | RESPIRATION RATE: 16 BRPM | OXYGEN SATURATION: 96 %

## 2022-09-14 DIAGNOSIS — R73.01 IMPAIRED FASTING GLUCOSE: Primary | Chronic | ICD-10-CM

## 2022-09-14 DIAGNOSIS — Z12.31 VISIT FOR SCREENING MAMMOGRAM: ICD-10-CM

## 2022-09-14 DIAGNOSIS — E78.2 MIXED HYPERLIPIDEMIA: ICD-10-CM

## 2022-09-14 LAB — HBA1C MFR BLD: 5.5 % (ref 0–5.6)

## 2022-09-14 PROCEDURE — 36415 COLL VENOUS BLD VENIPUNCTURE: CPT | Performed by: INTERNAL MEDICINE

## 2022-09-14 PROCEDURE — 77067 SCR MAMMO BI INCL CAD: CPT

## 2022-09-14 PROCEDURE — 83036 HEMOGLOBIN GLYCOSYLATED A1C: CPT | Performed by: INTERNAL MEDICINE

## 2022-09-14 PROCEDURE — 99213 OFFICE O/P EST LOW 20 MIN: CPT | Performed by: INTERNAL MEDICINE

## 2022-09-14 RX ORDER — ROSUVASTATIN CALCIUM 10 MG/1
10 TABLET, COATED ORAL AT BEDTIME
Qty: 90 TABLET | Refills: 3 | Status: SHIPPED | OUTPATIENT
Start: 2022-09-14 | End: 2022-12-08

## 2022-09-14 RX ORDER — ROSUVASTATIN CALCIUM 20 MG/1
20 TABLET, COATED ORAL AT BEDTIME
Qty: 90 TABLET | Refills: 3 | Status: SHIPPED | OUTPATIENT
Start: 2022-09-14 | End: 2022-09-14

## 2022-09-14 NOTE — PROGRESS NOTES
"  Assessment & Plan     Impaired fasting glucose  Check a1c and consider discontinue metformin if a1c remains in normal range  - Hemoglobin A1c      Return in about 3 months (around 12/14/2022) for Follow up, with me, in person, sooner if symptoms worsen or do not improve.    DO THIAGO Rojas [facility] FARIDA Dickens is a 62 year old, presenting for the following health issues:      HPI     Follow-up on prediabetes. She is on metformin 500mg once daily. Weaned down from BID. She is due for a1c. Doing well, no SE or new symptoms. Continues with healthy lifestyle.    Lab Results   Component Value Date    A1C 5.5 06/10/2022    A1C 5.4 09/20/2013       HM: Discussed due for shingrix      Review of Systems   Constitutional, HEENT, cardiovascular, pulmonary, gi and gu systems are negative, except as otherwise noted.      Objective    /78 (BP Location: Right arm, Patient Position: Sitting, Cuff Size: Adult Regular)   Pulse 76   Resp 16   Ht 1.676 m (5' 6\")   Wt 78.4 kg (172 lb 12.8 oz)   SpO2 96%   BMI 27.89 kg/m    Body mass index is 27.89 kg/m .  Physical Exam     GENERAL APPEARANCE: AAOx3, no distress. Well developed.    PSYCH: appropriate mood and affect.               "

## 2022-09-14 NOTE — TELEPHONE ENCOUNTER
----- Message from Camryn Graham DO sent at 9/14/2022  8:40 AM CDT -----  Please call Chrissie:    A1C remains stable/well controlled. Let's discontinue metformin.  Let's decrease cholesterol medication dose: Stop 20mg rosuvastatin, Start 10mg rosuvastatin nightly. New prescription sent.  Follow-up with me in 3-4 months for close monitoring.    Thanks,  Dr Graham

## 2022-09-14 NOTE — TELEPHONE ENCOUNTER
Writer called patient and reviewed result note below. Patient expressed verbal understanding and appreciative of follow up. Will follow up with pharmacy for medication.    Writer transferred patient to central scheduling per request to schedule follow up appointment in 3-4 months.    No further questions/concerns at this time.     Elmer Wells RN  [facility]

## 2022-11-15 ENCOUNTER — OFFICE VISIT (OUTPATIENT)
Dept: ENDOCRINOLOGY | Facility: CLINIC | Age: 62
End: 2022-11-15
Attending: INTERNAL MEDICINE
Payer: COMMERCIAL

## 2022-11-15 VITALS
DIASTOLIC BLOOD PRESSURE: 75 MMHG | HEIGHT: 66 IN | HEART RATE: 70 BPM | SYSTOLIC BLOOD PRESSURE: 114 MMHG | BODY MASS INDEX: 27.68 KG/M2 | WEIGHT: 172.2 LBS

## 2022-11-15 DIAGNOSIS — E04.2 NON-TOXIC MULTINODULAR GOITER: Primary | ICD-10-CM

## 2022-11-15 PROCEDURE — 99204 OFFICE O/P NEW MOD 45 MIN: CPT | Performed by: INTERNAL MEDICINE

## 2022-11-15 NOTE — LETTER
11/15/2022         RE: Dominique Craft  [address]  [facility] [address]        Dear Colleague,    Thank you for referring your patient, Dominique Craft, to the [facility] [place]. Please see a copy of my visit note below.    Name: Dominique Craft is a 62 year old woman, seen at the request of Dr. Camryn Graham for evaluation of     Chief Complaint   Patient presents with     Thyroid Problem       HPI:  Recent issues:  Here for evaluation of thyroid nodule problem  Reviewed medical history from patient and Epic chart record        ~2016. Diagnosis of thyroid gland problem, details not available  Previous medical evaluations with Dr Suzy Morales and Cornelia Brasher/Katia Flores  Recalls having elevated cholesterol level, then advised to start lipid medication  Took simvastatin medication and then noted throat symptoms  Reports looking at internet information that indicated simvastatin associated with thyroid problems    Diagnosis of thyroid nodules, has seen Dr. Shane Portillo for endocrinology evaluations  Diagnosis of multinodular goiter and subacute thyroiditis, but limited medical records available   Has had thyroid U/S imaging previously  She recalls having FNA biopsies... of 5 different nodules, benign results  Took thyroid medication for approx 2 months, then discontinued  No subsequent thyroid med use    6/16/22 Thyroid U/S:   Right lobe 4.6 x 1.7 x 1.8 cm and left lobe 4.6 x 2.5 x 2.3 cm. Homogeneous echotexture.    1. RUL 1.0 x 0.8 x 0.8 cm solid nodule   2. RUL 1.1 x 0.9 x 0.5 cm solid nodule    3. RML-RLL 1.0 x 1.0 x 0.7 cm solid nodule    4. RLL 1.0 x 0.8 x 0.6 cm solid nodule    5. MARCUS-LML 1.0 x 0.8 x 0.6 cm solid nodule   6. LML 0.8 x 0.8 x 0.5 cm solid nodule    8. LLL 2.9 x 1.9 x 1.9 cm solid nodule    9. Isthmus 1.2 x 1.1 x 0.4 cm solid nodule    10. RML 1.2 x 0.6 x 1.0 cm cystic nodule, new                                                    Few  "mildly prominent cervical lymph nodes including 0.8 x 0.6 x 0.5 cm lymph node in the right mid neck area with normal fatty hilum, indeterminate     Previous [facility] thyroid tests include:     Lab Test 06/10/22  0742   TSH 0.66     Additional health history:  Neck radiation treatment: None  Fam Hx thyroid disease: Mother, sister- goiter, took \"radiation treatment\"    Recent [facility] labs include:  Lab Results   Component Value Date    TSH 0.66 06/10/2022     Other chronic illnesses include:   Kidney stones:  Followed by PCP   Anemia:    Followed by PCP   Hyperlipidemia: Takes rosuvastatin med, followed by PCP      Lives in [place], [place], 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 Camryn Graham/[facility] for general medicine evaluations.    PMH/PSH:  Past Medical History:   Diagnosis Date     Anemia      DVT (deep venous thrombosis) (H)     after tummy tuck in 2006, right leg     Epistaxis     ENT     Gallstones     s/p lisa     GERD (gastroesophageal reflux disease)      Hyperlipidemia      Insomnia      Nephrolithiasis      Obesity (BMI 30-39.9)      PE (pulmonary embolism)     after tummy tuch 2006     Plantar fibromatosis      Thyroid nodules     benign and biopsied, previously on Sythroid     Past Surgical History:   Procedure Laterality Date     APPENDECTOMY OPEN  1981     CHOLECYSTECTOMY  1993     COLONOSCOPY  3/28/2014    Procedure: COLONOSCOPY;  COLONOSCOPY (MAC SEDATION);  Surgeon: Thais Gregory MD;  Location:  GI     COSMETIC ABDOMINOPLASTY  2/2006 and 03/2007    tummy tuck and breast lift , revision of abdominoplasty in  2007     HYSTERECTOMY  2008    prophylactic due to FH of cancer       Family Hx:  Family History   Problem Relation Age of Onset     Cancer Sister         ovarian cancer     Obesity Sister         s/p Vitor-en-y     Cancer Mother         kidney, colon and liver     Cancer - colorectal Mother          Social Hx:  Social History     Socioeconomic History     Marital status: "      Spouse name: Not on file     Number of children: 0     Years of education: Not on file     Highest education level: Not on file   Occupational History     Occupation:      Employer: [employer]   Tobacco Use     Smoking status: Former     Packs/day: 1.00     Years: 25.00     Pack years: 25.00     Types: Cigarettes     Start date:      Quit date: 1996     Years since quittin.4     Smokeless tobacco: Never   Substance and Sexual Activity     Alcohol use: Yes     Comment: 1/month     Drug use: No     Sexual activity: Yes     Partners: Male     Birth control/protection: Other     Comment: hysterectomy   Other Topics Concern      Service No     Blood Transfusions No     Caffeine Concern Yes     Occupational Exposure No     Hobby Hazards No     Sleep Concern Yes     Stress Concern Yes     Weight Concern Yes     Special Diet No     Back Care No     Exercise Yes     Bike Helmet No     Seat Belt Yes     Self-Exams Yes     Parent/sibling w/ CABG, MI or angioplasty before 65F 55M? No   Social History Narrative         Social Determinants of Health     Financial Resource Strain: Not on file   Food Insecurity: Not on file   Transportation Needs: Not on file   Physical Activity: Not on file   Stress: Not on file   Social Connections: Not on file   Intimate Partner Violence: Not on file   Housing Stability: Not on file          MEDICATIONS:  has a current medication list which includes the following prescription(s): vitamin d-3, cyanocobalamin, and rosuvastatin.    ROS:     ROS: 10 point ROS neg other than the symptoms noted above in the HPI.    GENERAL: some fatigue, wt stable; denies fevers, chills, night sweats.   HEENT: occasional rhaspy voice; denies dysphagia, odonophagia, diplopia, neck pain  THYROID:  no apparent hyper or hypothyroid symptoms  CV: no chest pain, pressure, palpitations  LUNGS: no SOB, SLOAN, cough, wheezing   ABDOMEN: some reflux; no diarrhea, constipation,  "abdominal pain  EXTREMITIES: no rashes, ulcers, edema  NEUROLOGY: no headaches, denies changes in vision, tingling, extremitiy numbness   MSK: pain at right middle finger s/p cat bite?; denies other arthralgias or muscle weakness  SKIN: no rashes or lesions  : no menses since hysterectomy mid 40's  PSYCH:  stable mood, no significant anxiety or depression  ENDOCRINE: no heat or cold intolerance    Physical Exam   VS: /75   Pulse 70   Ht 1.676 m (5' 6\")   Wt 78.1 kg (172 lb 3.2 oz)   BMI 27.79 kg/m    GENERAL: AXOX3, NAD, well dressed, answering questions appropriately, appears stated age.  THYROID:  Mildly enlarged thyroid with L>R asymmetry, size estimat 30 gm, no neck adenopathy noted  HEENT: neck non-tender, no exopthalmous, no proptosis, EOMI  CV: RRR, no rubs, gallops, no murmurs  LUNGS: CTAB, no wheezes, rales, or ronchi  ABDOMEN: mildly obese, soft, nontender, nondistended  EXTREMITIES: no edema, +pedal pulses, no lesions  NEUROLOGY: CN grossly intact, + DTR lower extremity, no tremors  MSK: grossly intact  SKIN: no rashes, no lesions    LABS:    All pertinent notes, labs, and images personally reviewed by me.     A/P:  Encounter Diagnosis   Name Primary?     Non-toxic multinodular goiter Yes       Comments:  Reviewed health history and thyroid nodule issues.  Difficult to understand the previous thyroid nodule evaluation and U/S imaging without medical recordes  Previous 6/2022 TSH level normal, indicating normal thyroid gland function    Plan:  Discussed general issues with the thyroid nodule diagnosis and management  We reviewed highlights of the patient's previous thyroid ultrasound information available  Discussed lab tests used to assess patient thyroid hormone levels  Reviewed the thyroid nodule fine needle aspiration (FNA) biopsy option  We discussed treatment options with observation plan vs thyroid surgery     Recommend:  Continue the observation plan for the MNG at this time  Will " request most recent progress notes, thyroid U/S and FNA reports from Dr. Portillo's office  Monitor for neck symptom changes  Current plan for repeat thyroid U/S and TSH lab test in Fall '2023  If significant size enlargement of thyroid nodules and size >1.5 cm, consider repeat FNA biopsy(s)  If neck discomfort related to the MNG, consider thyroid surgery consultation for partial vs total thyroidectomy surgery  Will message patient with summary conclusions when these outside records available     Addressed patient questions today     There are no Patient Instructions on file for this visit.    Future labs ordered today: No orders of the defined types were placed in this encounter.    Radiology/Consults ordered today:     Total time spent on day of encounter:  46 min    Follow-up:  11/2023 or prn    MARI Dominguez MD, MS  Endocrinology  [facility]    CC: Cornelia Brasher and Camryn Graham      Again, thank you for allowing me to participate in the care of your patient.        Sincerely,        Zaki Dominguez MD

## 2022-11-15 NOTE — PROGRESS NOTES
Name: Dominique Craft is a 62 year old woman, seen at the request of Dr. Camryn Graham for evaluation of     Chief Complaint   Patient presents with     Thyroid Problem       HPI:  Recent issues:  Here for evaluation of thyroid nodule problem  Reviewed medical history from patient and Epic chart record        ~2016. Diagnosis of thyroid gland problem, details not available  Previous medical evaluations with Dr Suzy Morales and Cornelia Brasher/Katia Flores  Recalls having elevated cholesterol level, then advised to start lipid medication  Took simvastatin medication and then noted throat symptoms  Reports looking at internet information that indicated simvastatin associated with thyroid problems    Diagnosis of thyroid nodules, has seen Dr. Shane Portillo for endocrinology evaluations  Diagnosis of multinodular goiter and subacute thyroiditis, but limited medical records available   Has had thyroid U/S imaging previously  She recalls having FNA biopsies... of 5 different nodules, benign results  Took thyroid medication for approx 2 months, then discontinued  No subsequent thyroid med use    6/16/22 Thyroid U/S:   Right lobe 4.6 x 1.7 x 1.8 cm and left lobe 4.6 x 2.5 x 2.3 cm. Homogeneous echotexture.    1. RUL 1.0 x 0.8 x 0.8 cm solid nodule   2. RUL 1.1 x 0.9 x 0.5 cm solid nodule    3. RML-RLL 1.0 x 1.0 x 0.7 cm solid nodule    4. RLL 1.0 x 0.8 x 0.6 cm solid nodule    5. MARCUS-LML 1.0 x 0.8 x 0.6 cm solid nodule   6. LML 0.8 x 0.8 x 0.5 cm solid nodule    8. LLL 2.9 x 1.9 x 1.9 cm solid nodule    9. Isthmus 1.2 x 1.1 x 0.4 cm solid nodule    10. RML 1.2 x 0.6 x 1.0 cm cystic nodule, new                                                    Few mildly prominent cervical lymph nodes including 0.8 x 0.6 x 0.5 cm lymph node in the right mid neck area with normal fatty hilum, indeterminate     Previous [facility] thyroid tests include:     Lab Test 06/10/22  0742   TSH 0.66     Additional health history:  Neck radiation  "treatment: None  Fam Hx thyroid disease: Mother, sister- goiter, took \"radiation treatment\"    Recent [facility] labs include:  Lab Results   Component Value Date    TSH 0.66 06/10/2022     Other chronic illnesses include:   Kidney stones:  Followed by PCP   Anemia:    Followed by PCP   Hyperlipidemia: Takes rosuvastatin med, followed by PCP      Lives in [place], [place], works for [employer]  Sees Dr. Camryn Graham/[facility] for general medicine evaluations.    PMH/PSH:  Past Medical History:   Diagnosis Date     Anemia      DVT (deep venous thrombosis) (H)     after tummy tuck in 2006, right leg     Epistaxis     ENT     Gallstones     s/p lisa     GERD (gastroesophageal reflux disease)      Hyperlipidemia      Insomnia      Nephrolithiasis      Obesity (BMI 30-39.9)      PE (pulmonary embolism)     after tummy tuch 2006     Plantar fibromatosis      Thyroid nodules     benign and biopsied, previously on Sythroid     Past Surgical History:   Procedure Laterality Date     APPENDECTOMY OPEN  1981     CHOLECYSTECTOMY  1993     COLONOSCOPY  3/28/2014    Procedure: COLONOSCOPY;  COLONOSCOPY (MAC SEDATION);  Surgeon: Thais Gregory MD;  Location:  GI     COSMETIC ABDOMINOPLASTY  2/2006 and 03/2007    tummy tuck and breast lift , revision of abdominoplasty in  2007     HYSTERECTOMY  2008    prophylactic due to FH of cancer       Family Hx:  Family History   Problem Relation Age of Onset     Cancer Sister         ovarian cancer     Obesity Sister         s/p Vitor-en-y     Cancer Mother         kidney, colon and liver     Cancer - colorectal Mother          Social Hx:  Social History     Socioeconomic History     Marital status:      Spouse name: Not on file     Number of children: 0     Years of education: Not on file     Highest education level: Not on file   Occupational History     Occupation:      Employer: [employer]   Tobacco Use     Smoking status: Former     Packs/day: " 1.00     Years: 25.00     Pack years: 25.00     Types: Cigarettes     Start date:      Quit date: 1996     Years since quittin.4     Smokeless tobacco: Never   Substance and Sexual Activity     Alcohol use: Yes     Comment: 1/month     Drug use: No     Sexual activity: Yes     Partners: Male     Birth control/protection: Other     Comment: hysterectomy   Other Topics Concern      Service No     Blood Transfusions No     Caffeine Concern Yes     Occupational Exposure No     Hobby Hazards No     Sleep Concern Yes     Stress Concern Yes     Weight Concern Yes     Special Diet No     Back Care No     Exercise Yes     Bike Helmet No     Seat Belt Yes     Self-Exams Yes     Parent/sibling w/ CABG, MI or angioplasty before 65F 55M? No   Social History Narrative         Social Determinants of Health     Financial Resource Strain: Not on file   Food Insecurity: Not on file   Transportation Needs: Not on file   Physical Activity: Not on file   Stress: Not on file   Social Connections: Not on file   Intimate Partner Violence: Not on file   Housing Stability: Not on file          MEDICATIONS:  has a current medication list which includes the following prescription(s): vitamin d-3, cyanocobalamin, and rosuvastatin.    ROS:     ROS: 10 point ROS neg other than the symptoms noted above in the HPI.    GENERAL: some fatigue, wt stable; denies fevers, chills, night sweats.   HEENT: occasional rhaspy voice; denies dysphagia, odonophagia, diplopia, neck pain  THYROID:  no apparent hyper or hypothyroid symptoms  CV: no chest pain, pressure, palpitations  LUNGS: no SOB, SLOAN, cough, wheezing   ABDOMEN: some reflux; no diarrhea, constipation, abdominal pain  EXTREMITIES: no rashes, ulcers, edema  NEUROLOGY: no headaches, denies changes in vision, tingling, extremitiy numbness   MSK: pain at right middle finger s/p cat bite?; denies other arthralgias or muscle weakness  SKIN: no rashes or lesions  : no menses  "since hysterectomy mid 40's  PSYCH:  stable mood, no significant anxiety or depression  ENDOCRINE: no heat or cold intolerance    Physical Exam   VS: /75   Pulse 70   Ht 1.676 m (5' 6\")   Wt 78.1 kg (172 lb 3.2 oz)   BMI 27.79 kg/m    GENERAL: AXOX3, NAD, well dressed, answering questions appropriately, appears stated age.  THYROID:  Mildly enlarged thyroid with L>R asymmetry, size estimat 30 gm, no neck adenopathy noted  HEENT: neck non-tender, no exopthalmous, no proptosis, EOMI  CV: RRR, no rubs, gallops, no murmurs  LUNGS: CTAB, no wheezes, rales, or ronchi  ABDOMEN: mildly obese, soft, nontender, nondistended  EXTREMITIES: no edema, +pedal pulses, no lesions  NEUROLOGY: CN grossly intact, + DTR lower extremity, no tremors  MSK: grossly intact  SKIN: no rashes, no lesions    LABS:    All pertinent notes, labs, and images personally reviewed by me.     A/P:  Encounter Diagnosis   Name Primary?     Non-toxic multinodular goiter Yes       Comments:  Reviewed health history and thyroid nodule issues.  Difficult to understand the previous thyroid nodule evaluation and U/S imaging without medical recordes  Previous 6/2022 TSH level normal, indicating normal thyroid gland function    Plan:  Discussed general issues with the thyroid nodule diagnosis and management  We reviewed highlights of the patient's previous thyroid ultrasound information available  Discussed lab tests used to assess patient thyroid hormone levels  Reviewed the thyroid nodule fine needle aspiration (FNA) biopsy option  We discussed treatment options with observation plan vs thyroid surgery     Recommend:  Continue the observation plan for the MNG at this time  Will request most recent progress notes, thyroid U/S and FNA reports from Dr. Portillo's office  Monitor for neck symptom changes  Current plan for repeat thyroid U/S and TSH lab test in Fall '2023  If significant size enlargement of thyroid nodules and size >1.5 cm, consider repeat " FNA biopsy(s)  If neck discomfort related to the MNG, consider thyroid surgery consultation for partial vs total thyroidectomy surgery  Will message patient with summary conclusions when these outside records available     Addressed patient questions today     There are no Patient Instructions on file for this visit.    Future labs ordered today: No orders of the defined types were placed in this encounter.    Radiology/Consults ordered today:     Total time spent on day of encounter:  46 min    Follow-up:  11/2023 or grace Dominguez MD, MS  Endocrinology  [facility]    CC: Cornelia Brasher and Camryn Graham

## 2022-12-07 ENCOUNTER — OFFICE VISIT (OUTPATIENT)
Dept: FAMILY MEDICINE | Facility: CLINIC | Age: 62
End: 2022-12-07
Payer: COMMERCIAL

## 2022-12-07 VITALS
OXYGEN SATURATION: 99 % | BODY MASS INDEX: 26.93 KG/M2 | SYSTOLIC BLOOD PRESSURE: 110 MMHG | TEMPERATURE: 97.1 F | HEART RATE: 69 BPM | RESPIRATION RATE: 16 BRPM | HEIGHT: 66 IN | WEIGHT: 167.6 LBS | DIASTOLIC BLOOD PRESSURE: 72 MMHG

## 2022-12-07 DIAGNOSIS — R73.01 IMPAIRED FASTING GLUCOSE: Chronic | ICD-10-CM

## 2022-12-07 DIAGNOSIS — E04.1 THYROID NODULE: Chronic | ICD-10-CM

## 2022-12-07 DIAGNOSIS — E78.2 MIXED HYPERLIPIDEMIA: Primary | ICD-10-CM

## 2022-12-07 LAB
CHOLEST SERPL-MCNC: 131 MG/DL
HBA1C MFR BLD: 5.3 % (ref 0–5.6)
HDLC SERPL-MCNC: 55 MG/DL
LDLC SERPL CALC-MCNC: 62 MG/DL
NONHDLC SERPL-MCNC: 76 MG/DL
TRIGL SERPL-MCNC: 70 MG/DL

## 2022-12-07 PROCEDURE — 36415 COLL VENOUS BLD VENIPUNCTURE: CPT | Performed by: INTERNAL MEDICINE

## 2022-12-07 PROCEDURE — 99213 OFFICE O/P EST LOW 20 MIN: CPT | Performed by: INTERNAL MEDICINE

## 2022-12-07 PROCEDURE — 80061 LIPID PANEL: CPT | Performed by: INTERNAL MEDICINE

## 2022-12-07 PROCEDURE — 83036 HEMOGLOBIN GLYCOSYLATED A1C: CPT | Performed by: INTERNAL MEDICINE

## 2022-12-07 ASSESSMENT — PAIN SCALES - GENERAL: PAINLEVEL: NO PAIN (0)

## 2022-12-07 NOTE — LETTER
December 8, 2022      Chrissie M Venancio  [address] [facility] [address]        Dear MsLillianVenancio,    We are writing to inform you of your test results.    Lab results show:   Stable/well controlled cholesterol and stable/well controlled prediabetes   Continue current plan as you are doing and keep up the good work!     Best,   Dr Graham       Resulted Orders   Hemoglobin A1c   Result Value Ref Range    Hemoglobin A1C 5.3 0.0 - 5.6 %      Comment:      Normal <5.7%   Prediabetes 5.7-6.4%    Diabetes 6.5% or higher     Note: Adopted from ADA consensus guidelines.   Lipid panel reflex to direct LDL Fasting   Result Value Ref Range    Cholesterol 131 <200 mg/dL    Triglycerides 70 <150 mg/dL    Direct Measure HDL 55 >=50 mg/dL    LDL Cholesterol Calculated 62 <=100 mg/dL    Non HDL Cholesterol 76 <130 mg/dL    Narrative    Cholesterol  Desirable:  <200 mg/dL    Triglycerides  Normal:  Less than 150 mg/dL  Borderline High:  150-199 mg/dL  High:  200-499 mg/dL  Very High:  Greater than or equal to 500 mg/dL    Direct Measure HDL  Female:  Greater than or equal to 50 mg/dL   Male:  Greater than or equal to 40 mg/dL    LDL Cholesterol  Desirable:  <100mg/dL  Above Desirable:  100-129 mg/dL   Borderline High:  130-159 mg/dL   High:  160-189 mg/dL   Very High:  >= 190 mg/dL    Non HDL Cholesterol  Desirable:  130 mg/dL  Above Desirable:  130-159 mg/dL  Borderline High:  160-189 mg/dL  High:  190-219 mg/dL  Very High:  Greater than or equal to 220 mg/dL

## 2022-12-07 NOTE — PROGRESS NOTES
"  Assessment & Plan     Mixed hyperlipidemia  Recheck fasting lipids on lower dose of statin (hx significant weight loss)  - Lipid panel reflex to direct LDL Fasting    Impaired fasting glucose  Recheck a1c off metformin (hx of significant weight loss)  - Hemoglobin A1c    Thyroid nodules  Endo plans to recheck US in one year      Return in about 1 year (around 12/7/2023) for Routine preventive, with me, in person, sooner if symptoms worsen or do not improve.    Camryn Graham DO  [facility] [place] Dickens is a 62 year old, presenting for the following health issues:  RECHECK      HPI     Follow up on A1C and lipids  Off metformin  Compliant with 10mg statin (down from 20mg)  Interested in rechecking labs  Discussed due for shingrix/flu-declines today    Review of Systems   Constitutional, HEENT, cardiovascular, pulmonary, gi and gu systems are negative, except as otherwise noted.      Objective    /72 (BP Location: Left arm, Patient Position: Sitting, Cuff Size: Adult Regular)   Pulse 69   Temp 97.1  F (36.2  C) (Temporal)   Resp 16   Ht 1.676 m (5' 6\")   Wt 76 kg (167 lb 9.6 oz)   SpO2 99%   BMI 27.05 kg/m    Body mass index is 27.05 kg/m .  Physical Exam     GENERAL APPEARANCE: AAOx3, no distress. Well developed.    PSYCH: appropriate mood and affect.                   "

## 2022-12-08 ENCOUNTER — NURSE TRIAGE (OUTPATIENT)
Dept: NURSING | Facility: CLINIC | Age: 62
End: 2022-12-08

## 2022-12-08 DIAGNOSIS — E78.5 HYPERLIPIDEMIA, UNSPECIFIED HYPERLIPIDEMIA TYPE: Primary | Chronic | ICD-10-CM

## 2022-12-08 NOTE — TELEPHONE ENCOUNTER
Pt received a call from the pharmacy.      Stating they were working on a new prescription of 20 Mg's Crestor.     Pt has been taking 10 Mg's at bedtime.    But had her LIPID Profile done yesterday and everything is within normal range.    So Pt is wondering why there is a new prescription for 20 Mg's at the pharmacy for Pt care.    Pt was hoping to get off of the medication all together.    Will somebody please follow up with the Pt regarding the increase in medication for Pt care.  As Pt did not receive any new messages about and increase in medication for Pt care.    Thank you     Fernanda Crowder RN  Central Triage Red Flags/Med Refills

## 2022-12-08 NOTE — TELEPHONE ENCOUNTER
I personally called patient.   She has been on 10mg statin recently which was recommended and recently reduced. However given stability of labs, patient desires to trial being off statin which is reasonable.   She will discontinue statin and repeat labs placed for 3 months time (a1c/lipid). She will schedule lab appointment, fasting.   If significantly worsening, she is agreeable to re-start statin.

## 2022-12-08 NOTE — TELEPHONE ENCOUNTER
Per chart review 12/07/2022 lab notes read:    Lab results show:  Stable/well controlled cholesterol and stable/well controlled prediabetes  Continue current plan as you are doing and keep up the good work!     Best,  Dr Graham    09/14/2022 Crestor Rx was approved for a year supply. With pharmacy notes -Please void the 20mg prescription sent earlier.    Triage called [facility] to ensure they have read providers notes to cancel Rx for 20 mg dose. Pharmacist agreed to deactivate Crestor 20 mg tablet dose    Pt was called and advised to continue taking Crestor 10 mg tablet. Pt states she discussed with provider yesterday that if labs were improved, she could stop Crestor. Pt asked triage to verify with provider if she is to continue taking Crestor 10 mg tablet daily.    Pt will need a call back with providers advice. Ok to leave a detailed voice message.

## 2022-12-16 ENCOUNTER — TRANSFERRED RECORDS (OUTPATIENT)
Dept: HEALTH INFORMATION MANAGEMENT | Facility: CLINIC | Age: 62
End: 2022-12-16

## 2022-12-16 ENCOUNTER — ANCILLARY PROCEDURE (OUTPATIENT)
Dept: GENERAL RADIOLOGY | Facility: CLINIC | Age: 62
End: 2022-12-16
Attending: PHYSICIAN ASSISTANT
Payer: COMMERCIAL

## 2022-12-16 ENCOUNTER — OFFICE VISIT (OUTPATIENT)
Dept: URGENT CARE | Facility: URGENT CARE | Age: 62
End: 2022-12-16
Payer: COMMERCIAL

## 2022-12-16 VITALS
DIASTOLIC BLOOD PRESSURE: 77 MMHG | OXYGEN SATURATION: 99 % | SYSTOLIC BLOOD PRESSURE: 115 MMHG | RESPIRATION RATE: 16 BRPM | BODY MASS INDEX: 26.95 KG/M2 | WEIGHT: 167 LBS | HEART RATE: 82 BPM

## 2022-12-16 DIAGNOSIS — S69.92XA INJURY OF FINGER OF LEFT HAND, INITIAL ENCOUNTER: ICD-10-CM

## 2022-12-16 DIAGNOSIS — S69.92XA WRIST INJURY, LEFT, INITIAL ENCOUNTER: ICD-10-CM

## 2022-12-16 DIAGNOSIS — S62.102A WRIST FRACTURE, LEFT, CLOSED, INITIAL ENCOUNTER: ICD-10-CM

## 2022-12-16 DIAGNOSIS — S69.92XA WRIST INJURY, LEFT, INITIAL ENCOUNTER: Primary | ICD-10-CM

## 2022-12-16 PROCEDURE — 73110 X-RAY EXAM OF WRIST: CPT | Mod: TC | Performed by: RADIOLOGY

## 2022-12-16 PROCEDURE — 73140 X-RAY EXAM OF FINGER(S): CPT | Mod: TC | Performed by: RADIOLOGY

## 2022-12-16 PROCEDURE — 99214 OFFICE O/P EST MOD 30 MIN: CPT | Performed by: PHYSICIAN ASSISTANT

## 2022-12-16 NOTE — PROGRESS NOTES
"  Assessment & Plan     Wrist injury, left, initial encounter    Patient fell on ice and landed on left wrist today  Wrist pain and swelling  - XR Wrist Left G/E 3 Views; Future    Injury of finger of left hand, initial encounter    Xray Negative for acute findings, read by Roque RICH at time of visit.  RICE treatment: Rest, Ice, compression, elevation     - XR Finger Left G/E 2 Views; Future    Wrist fracture, left, closed, initial encounter    Wrist xray shows impacted intraarticular comminuted fracture  There is concern for surgery with this fracture  I am sending patient to [facility] walk in clinic for ortho consultation today  - Wrist/Arm/Hand Supplies Order for DME - ONLY FOR DME  - Orthopedic  Referral; Future    Review of external notes as documented elsewhere in note       BMI:   Estimated body mass index is 26.95 kg/m  as calculated from the following:    Height as of 12/7/22: 1.676 m (5' 6\").    Weight as of this encounter: 75.8 kg (167 lb).     CONSULTATION/REFERRAL to [facility]    No follow-ups on file.    Roque Garibay, HILARIO, PAZEUS  [facility] [place] Dickens is a 62 year old, presenting for the following health issues:  Wrist Injury (Pt fell this morning and hurt left wrist )      HPI   Review of Systems   Constitutional, HEENT, cardiovascular, pulmonary, gi and gu systems are negative, except as otherwise noted.      Objective    /77   Pulse 82   Resp 16   Wt 75.8 kg (167 lb)   SpO2 99%   BMI 26.95 kg/m    Body mass index is 26.95 kg/m .  Physical Exam   GENERAL: healthy, alert and no distress  MS: Positive for DROM of left wrist.  Positive for swelling and tenderness left wrist  SKIN: Positive for edema and swelling of left wrist  NEURO: Normal strength and tone, mentation intact and speech normal  PSYCH: mentation appears normal, affect normal/bright  VASC: Cap refill < 2 sec  Xray - Reviewed and interpreted by me.  Positive for intraarticular " comminuted fracture present

## 2022-12-19 ENCOUNTER — TRANSFERRED RECORDS (OUTPATIENT)
Dept: ENDOCRINOLOGY | Facility: CLINIC | Age: 62
End: 2022-12-19

## 2022-12-20 ENCOUNTER — OFFICE VISIT (OUTPATIENT)
Dept: FAMILY MEDICINE | Facility: CLINIC | Age: 62
End: 2022-12-20
Payer: COMMERCIAL

## 2022-12-20 VITALS
HEIGHT: 66 IN | WEIGHT: 169.4 LBS | BODY MASS INDEX: 27.23 KG/M2 | TEMPERATURE: 98.2 F | OXYGEN SATURATION: 100 % | SYSTOLIC BLOOD PRESSURE: 110 MMHG | RESPIRATION RATE: 20 BRPM | DIASTOLIC BLOOD PRESSURE: 72 MMHG | HEART RATE: 101 BPM

## 2022-12-20 DIAGNOSIS — Z01.818 PREOP GENERAL PHYSICAL EXAM: Primary | ICD-10-CM

## 2022-12-20 DIAGNOSIS — S62.102S: ICD-10-CM

## 2022-12-20 PROCEDURE — 99214 OFFICE O/P EST MOD 30 MIN: CPT | Performed by: FAMILY MEDICINE

## 2022-12-20 NOTE — PROGRESS NOTES
[facility]  [id number]  [place] [address]  Phone: [phone]  Primary Provider: Cornelia Brasher  Pre-op Performing Provider: BRANDY KILGORE      PREOPERATIVE EVALUATION:  Today's date: 12/20/2022    Dominique Craft is a 62 year old female who presents for a preoperative evaluation.    Surgical Information:  Surgery/Procedure: wrist-left  Surgery Location: [facility]  Surgeon: Karin  Surgery Date: 12/21/22  Time of Surgery: 745am  Where patient plans to recover: At home with family  Fax number for surgical facility: [phone]    Type of Anesthesia Anticipated: to be determined    Assessment & Plan     The proposed surgical procedure is considered INTERMEDIATE risk.    (Z01.818) Preop general physical exam  (primary encounter diagnosis)  Comment: No absolute contraindications to surgery.  Plan: May proceed as scheduled.    (N02.102S) Fracture of wrist, left, sequela  Comment: Joint line involvement, agree with surgical intervention.  Plan: May proceed as scheduled.    Patient Instructions   Overall, you're doing well.     Keep up with regular exercise.     No blood tests needed.     Think about getting a flu shot.     No aspirin, ibuprofen, or Aleve before surgery.     I would recommend both the flu and shingles shot after surgery.    After surgery, see your regular provider to discuss osteoporosis screening.          Risks and Recommendations:  The patient has the following additional risks and recommendations for perioperative complications:   - No identified additional risk factors other than previously addressed    Medication Instructions:  Patient is to take all scheduled medications on the day of surgery    RECOMMENDATION:  APPROVAL GIVEN to proceed with proposed procedure, without further diagnostic evaluation.    56}    Subjective     HPI related to upcoming procedure: Patient slipped on the ice and fell on an outstretched left arm.  See  x-ray review.  No prior history of wrist fractures.  The fall was accidental.  XR WRIST LEFT G/E 3 VIEWS 12/16/2022 10:57 AM     HISTORY: Wrist injury, left, initial encounter. Wrist pain.     COMPARISON: None.                                                                      IMPRESSION: Acute comminuted mildly displaced intra-articular fracture  of the distal radius with mild dorsal angulation of the distal radial  fracture fragments.      Preop Questions 12/20/2022   1. Have you ever had a heart attack or stroke? No   2. Have you ever had surgery on your heart or blood vessels, such as a stent placement, a coronary artery bypass, or surgery on an artery in your head, neck, heart, or legs? No   3. Do you have chest pain with activity? No   4. Do you have a history of  heart failure? No   5. Do you currently have a cold, bronchitis or symptoms of other infection? No   6. Do you have a cough, shortness of breath, or wheezing? No   7. Do you or anyone in your family have previous history of blood clots? YES -personal, 2006, appears provoked, occurred after surgery.   8. Do you or does anyone in your family have a serious bleeding problem such as prolonged bleeding following surgeries or cuts? No   9. Have you ever had problems with anemia or been told to take iron pills? No   10. Have you had any abnormal blood loss such as black, tarry or bloody stools, or abnormal vaginal bleeding? No   11. Have you ever had a blood transfusion? No   12. Are you willing to have a blood transfusion if it is medically needed before, during, or after your surgery? Yes   13. Have you or any of your relatives ever had problems with anesthesia? No   14. Do you have sleep apnea, excessive snoring or daytime drowsiness? No   15. Do you have any artifical heart valves or other implanted medical devices like a pacemaker, defibrillator, or continuous glucose monitor? No   16. Do you have artificial joints? No   17. Are you allergic to  latex? No   18. Is there any chance that you may be pregnant? No       Health Care Directive:  Patient does not have a Health Care Directive or Living Will:     Preoperative Review of :   reviewed - no record of controlled substances prescribed.      Status of Chronic Conditions:  See problem list for active medical problems.  Problems all longstanding and stable, except as noted/documented.  See ROS for pertinent symptoms related to these conditions.      Review of Systems  Constitutional, neuro, ENT, endocrine, pulmonary, cardiac, gastrointestinal, genitourinary, musculoskeletal, integument and psychiatric systems are negative, except as otherwise noted.    Patient Active Problem List    Diagnosis Date Noted     Impaired fasting glucose 06/10/2022     Priority: Medium     Nephrolithiasis 09/21/2013     Priority: Medium     History of pulmonary embolus (PE)      Priority: Medium     Had dct as well after tummy tuck in 2006, right leg; took coumadin for 9 months       Esophageal reflux 09/18/2013     Priority: Medium     Last endoscopy in 2013       Hyperlipidemia 09/18/2013     Priority: Medium     Problem list name updated by automated process. Provider to review       Thyroid nodules 09/18/2013     Priority: Medium     Following endo         Obesity (BMI 30-39.9) 09/18/2013     Priority: Medium     Epistaxis 09/18/2013     Priority: Medium      Past Medical History:   Diagnosis Date     Anemia      DVT (deep venous thrombosis) (H)     after tummy tuck in 2006, right leg     Epistaxis     ENT     Gallstones     s/p lisa     GERD (gastroesophageal reflux disease)      Hyperlipidemia      Insomnia      Nephrolithiasis      Obesity (BMI 30-39.9)      PE (pulmonary embolism)     after tummy tuch 2006     Plantar fibromatosis      Thyroid nodules     benign and biopsied, previously on Sythroid     Past Surgical History:   Procedure Laterality Date     APPENDECTOMY OPEN  1981     CHOLECYSTECTOMY  1993      COLONOSCOPY  3/28/2014    Procedure: COLONOSCOPY;  COLONOSCOPY (MAC SEDATION);  Surgeon: Thais Gregory MD;  Location:  GI     COSMETIC ABDOMINOPLASTY  2006 and 2007    tummy tuck and breast lift , revision of abdominoplasty in       HYSTERECTOMY  2008    prophylactic due to FH of cancer     Current Outpatient Medications   Medication Sig Dispense Refill     Cholecalciferol (VITAMIN D-3) 125 MCG (5000 UT) TABS Take by mouth daily       cyanocobalamin (VITAMIN B-12) 1000 MCG SUBL sublingual tablet Place 1,000 mcg under the tongue daily         Allergies   Allergen Reactions     Amoxicillin      Naproxen      Simvastatin         Social History     Tobacco Use     Smoking status: Former     Packs/day: 1.00     Years: 25.00     Pack years: 25.00     Types: Cigarettes     Start date:      Quit date: 1996     Years since quittin.5     Smokeless tobacco: Never   Substance Use Topics     Alcohol use: Yes     Comment: 1/month       History   Drug Use No         Objective     Pulse 101   Temp 98.2  F (36.8  C) (Temporal)   Resp 20   Wt 76.8 kg (169 lb 6.4 oz)   SpO2 100%   BMI 27.34 kg/m      Physical Exam  GENERAL: Healthy, alert and no distress  EYES: Eyes grossly normal to inspection, conjunctivae and sclerae normal  RESP: Lungs clear to auscultation - no rales, rhonchi or wheezes  CV: Regular rate and rhythm, normal S1 S2, no murmur  MS: Short arm splint on left upper extremity.  Fingers pink and warm.  NEURO: Normal strength and tone, mentation intact and speech normal  PSYCH: Mentation appears normal, affect normal/bright     Recent Labs   Lab Test 22  0729 22  0723 06/10/22  0742   HGB  --   --  14.7   PLT  --   --  245   NA  --   --  141   POTASSIUM  --   --  4.1   CR  --   --  0.78   A1C 5.3 5.5 5.5        Diagnostics:  No labs were ordered during this visit.   No EKG required, no history of coronary heart disease, significant arrhythmia, peripheral arterial disease or  other structural heart disease.    Revised Cardiac Risk Index (RCRI):  The patient has the following serious cardiovascular risks for perioperative complications:   - No serious cardiac risks = 0 points     RCRI Interpretation: 0 points: Class I (very low risk - 0.4% complication rate)           Signed Electronically by: Kimberley Vicente MD  Copy of this evaluation report is provided to requesting physician.

## 2022-12-20 NOTE — PATIENT INSTRUCTIONS
Overall, you're doing well.     Keep up with regular exercise.     No blood tests needed.     Think about getting a flu shot.     No aspirin, ibuprofen, or Aleve before surgery.     I would recommend both the flu and shingles shot after surgery.    After surgery, see your regular provider to discuss osteoporosis screening.

## 2023-01-09 ENCOUNTER — TRANSFERRED RECORDS (OUTPATIENT)
Dept: HEALTH INFORMATION MANAGEMENT | Facility: CLINIC | Age: 63
End: 2023-01-09

## 2023-03-22 ENCOUNTER — LAB (OUTPATIENT)
Dept: LAB | Facility: CLINIC | Age: 63
End: 2023-03-22
Payer: COMMERCIAL

## 2023-03-22 DIAGNOSIS — E78.5 HYPERLIPIDEMIA, UNSPECIFIED HYPERLIPIDEMIA TYPE: Chronic | ICD-10-CM

## 2023-03-22 DIAGNOSIS — E78.2 MIXED HYPERLIPIDEMIA: Primary | ICD-10-CM

## 2023-03-22 LAB
CHOLEST SERPL-MCNC: 210 MG/DL
HBA1C MFR BLD: 5.6 % (ref 0–5.6)
HDLC SERPL-MCNC: 48 MG/DL
LDLC SERPL CALC-MCNC: 136 MG/DL
NONHDLC SERPL-MCNC: 162 MG/DL
TRIGL SERPL-MCNC: 131 MG/DL

## 2023-03-22 PROCEDURE — 36415 COLL VENOUS BLD VENIPUNCTURE: CPT

## 2023-03-22 RX ORDER — ROSUVASTATIN CALCIUM 10 MG/1
10 TABLET, COATED ORAL AT BEDTIME
Qty: 90 TABLET | Refills: 3 | Status: SHIPPED | OUTPATIENT
Start: 2023-03-22 | End: 2024-02-07

## 2023-04-14 ENCOUNTER — TELEPHONE (OUTPATIENT)
Dept: FAMILY MEDICINE | Facility: CLINIC | Age: 63
End: 2023-04-14
Payer: COMMERCIAL

## 2023-04-14 NOTE — TELEPHONE ENCOUNTER
"Patient Quality Outreach    Patient is due for the following:   Cervical Cancer Screening - PAP Needed  Physical Preventive Adult Physical    Next Steps:   Schedule a Adult Preventative    Type of outreach:    Phone, spoke to patient/parent. Hysterectomy done 15yrs ago (est 47yrs of age) , scheduled preventative appointment with Dr. Graham , Appt for 6/28/2023, aware of preventative plus additional cost with other discussed health concerns if not covered.      PHQ2 Completed over phone, and charted      [facility] request record after 2014 but that was her last pap collected post hysterectomy ,Requested (2008 Hysterectomy)    Addendum LOV 12/7/2022 with Dr Graham, surgical Hx \" Hysterectomy PAP no longer Indicated\"    Questions for provider review:    None     Jamar Hassan MA          "

## 2023-05-13 ENCOUNTER — TRANSFERRED RECORDS (OUTPATIENT)
Dept: HEALTH INFORMATION MANAGEMENT | Facility: CLINIC | Age: 63
End: 2023-05-13
Payer: COMMERCIAL

## 2023-05-22 ENCOUNTER — TRANSFERRED RECORDS (OUTPATIENT)
Dept: HEALTH INFORMATION MANAGEMENT | Facility: CLINIC | Age: 63
End: 2023-05-22
Payer: COMMERCIAL

## 2023-05-31 ENCOUNTER — TRANSFERRED RECORDS (OUTPATIENT)
Dept: HEALTH INFORMATION MANAGEMENT | Facility: CLINIC | Age: 63
End: 2023-05-31
Payer: COMMERCIAL

## 2023-06-28 ENCOUNTER — OFFICE VISIT (OUTPATIENT)
Dept: FAMILY MEDICINE | Facility: CLINIC | Age: 63
End: 2023-06-28
Payer: COMMERCIAL

## 2023-06-28 VITALS
WEIGHT: 178.6 LBS | HEIGHT: 66 IN | RESPIRATION RATE: 18 BRPM | TEMPERATURE: 98.1 F | SYSTOLIC BLOOD PRESSURE: 121 MMHG | DIASTOLIC BLOOD PRESSURE: 84 MMHG | OXYGEN SATURATION: 100 % | BODY MASS INDEX: 28.7 KG/M2 | HEART RATE: 82 BPM

## 2023-06-28 DIAGNOSIS — R73.01 IMPAIRED FASTING GLUCOSE: ICD-10-CM

## 2023-06-28 DIAGNOSIS — E04.1 THYROID NODULE: Chronic | ICD-10-CM

## 2023-06-28 DIAGNOSIS — E78.2 MIXED HYPERLIPIDEMIA: ICD-10-CM

## 2023-06-28 DIAGNOSIS — Z00.00 ROUTINE HISTORY AND PHYSICAL EXAMINATION OF ADULT: Primary | ICD-10-CM

## 2023-06-28 DIAGNOSIS — M85.80 OSTEOPENIA, UNSPECIFIED LOCATION: ICD-10-CM

## 2023-06-28 LAB
ALBUMIN SERPL BCG-MCNC: 4.3 G/DL (ref 3.5–5.2)
ALP SERPL-CCNC: 116 U/L (ref 35–104)
ALT SERPL W P-5'-P-CCNC: 21 U/L (ref 0–50)
ANION GAP SERPL CALCULATED.3IONS-SCNC: 8 MMOL/L (ref 7–15)
AST SERPL W P-5'-P-CCNC: 16 U/L (ref 0–45)
BASOPHILS # BLD AUTO: 0 10E3/UL (ref 0–0.2)
BASOPHILS NFR BLD AUTO: 0 %
BILIRUB SERPL-MCNC: 0.3 MG/DL
BUN SERPL-MCNC: 22.1 MG/DL (ref 8–23)
CALCIUM SERPL-MCNC: 9.6 MG/DL (ref 8.8–10.2)
CHLORIDE SERPL-SCNC: 102 MMOL/L (ref 98–107)
CHOLEST SERPL-MCNC: 170 MG/DL
CREAT SERPL-MCNC: 0.76 MG/DL (ref 0.51–0.95)
DEPRECATED HCO3 PLAS-SCNC: 29 MMOL/L (ref 22–29)
EOSINOPHIL # BLD AUTO: 0.1 10E3/UL (ref 0–0.7)
EOSINOPHIL NFR BLD AUTO: 2 %
ERYTHROCYTE [DISTWIDTH] IN BLOOD BY AUTOMATED COUNT: 12.9 % (ref 10–15)
GFR SERPL CREATININE-BSD FRML MDRD: 89 ML/MIN/1.73M2
GLUCOSE SERPL-MCNC: 95 MG/DL (ref 70–99)
HBA1C MFR BLD: 5.4 % (ref 0–5.6)
HCT VFR BLD AUTO: 45.9 % (ref 35–47)
HDLC SERPL-MCNC: 61 MG/DL
HGB BLD-MCNC: 14.2 G/DL (ref 11.7–15.7)
IMM GRANULOCYTES # BLD: 0 10E3/UL
IMM GRANULOCYTES NFR BLD: 0 %
LDLC SERPL CALC-MCNC: 85 MG/DL
LYMPHOCYTES # BLD AUTO: 2.3 10E3/UL (ref 0.8–5.3)
LYMPHOCYTES NFR BLD AUTO: 36 %
MCH RBC QN AUTO: 29.5 PG (ref 26.5–33)
MCHC RBC AUTO-ENTMCNC: 30.9 G/DL (ref 31.5–36.5)
MCV RBC AUTO: 95 FL (ref 78–100)
MONOCYTES # BLD AUTO: 0.6 10E3/UL (ref 0–1.3)
MONOCYTES NFR BLD AUTO: 9 %
NEUTROPHILS # BLD AUTO: 3.4 10E3/UL (ref 1.6–8.3)
NEUTROPHILS NFR BLD AUTO: 53 %
NONHDLC SERPL-MCNC: 109 MG/DL
PLATELET # BLD AUTO: 241 10E3/UL (ref 150–450)
POTASSIUM SERPL-SCNC: 4 MMOL/L (ref 3.4–5.3)
PROT SERPL-MCNC: 6.6 G/DL (ref 6.4–8.3)
RBC # BLD AUTO: 4.81 10E6/UL (ref 3.8–5.2)
SODIUM SERPL-SCNC: 139 MMOL/L (ref 136–145)
TRIGL SERPL-MCNC: 119 MG/DL
WBC # BLD AUTO: 6.4 10E3/UL (ref 4–11)

## 2023-06-28 PROCEDURE — 80061 LIPID PANEL: CPT | Performed by: INTERNAL MEDICINE

## 2023-06-28 PROCEDURE — 36415 COLL VENOUS BLD VENIPUNCTURE: CPT | Performed by: INTERNAL MEDICINE

## 2023-06-28 PROCEDURE — 80053 COMPREHEN METABOLIC PANEL: CPT | Performed by: INTERNAL MEDICINE

## 2023-06-28 PROCEDURE — 99396 PREV VISIT EST AGE 40-64: CPT | Performed by: INTERNAL MEDICINE

## 2023-06-28 PROCEDURE — 85025 COMPLETE CBC W/AUTO DIFF WBC: CPT | Performed by: INTERNAL MEDICINE

## 2023-06-28 PROCEDURE — 83036 HEMOGLOBIN GLYCOSYLATED A1C: CPT | Performed by: INTERNAL MEDICINE

## 2023-06-28 ASSESSMENT — ENCOUNTER SYMPTOMS
FEVER: 0
DIARRHEA: 0
EYE PAIN: 0
DYSURIA: 0
ARTHRALGIAS: 0
JOINT SWELLING: 0
DIZZINESS: 0
BREAST MASS: 0
CONSTIPATION: 0
WEAKNESS: 0
MYALGIAS: 0
COUGH: 0
PALPITATIONS: 0
HEADACHES: 0
HEARTBURN: 0
HEMATURIA: 0
FREQUENCY: 0
PARESTHESIAS: 0
SORE THROAT: 0
ABDOMINAL PAIN: 0
CHILLS: 0
NAUSEA: 0
HEMATOCHEZIA: 0
NERVOUS/ANXIOUS: 0
SHORTNESS OF BREATH: 0

## 2023-06-28 ASSESSMENT — PAIN SCALES - GENERAL: PAINLEVEL: NO PAIN (0)

## 2023-06-28 NOTE — LETTER
June 29, 2023      Chrissie Craft  [address]  [place] [address]        Dear ,    We are writing to inform you of your test results.    Your test results fall within the expected range(s) or remain unchanged from previous results.  Please continue with current treatment plan.    Resulted Orders   Lipid panel reflex to direct LDL Fasting   Result Value Ref Range    Cholesterol 170 <200 mg/dL    Triglycerides 119 <150 mg/dL    Direct Measure HDL 61 >=50 mg/dL    LDL Cholesterol Calculated 85 <=100 mg/dL    Non HDL Cholesterol 109 <130 mg/dL    Narrative    Cholesterol  Desirable:  <200 mg/dL    Triglycerides  Normal:  Less than 150 mg/dL  Borderline High:  150-199 mg/dL  High:  200-499 mg/dL  Very High:  Greater than or equal to 500 mg/dL    Direct Measure HDL  Female:  Greater than or equal to 50 mg/dL   Male:  Greater than or equal to 40 mg/dL    LDL Cholesterol  Desirable:  <100mg/dL  Above Desirable:  100-129 mg/dL   Borderline High:  130-159 mg/dL   High:  160-189 mg/dL   Very High:  >= 190 mg/dL    Non HDL Cholesterol  Desirable:  130 mg/dL  Above Desirable:  130-159 mg/dL  Borderline High:  160-189 mg/dL  High:  190-219 mg/dL  Very High:  Greater than or equal to 220 mg/dL   Comprehensive metabolic panel   Result Value Ref Range    Sodium 139 136 - 145 mmol/L    Potassium 4.0 3.4 - 5.3 mmol/L    Chloride 102 98 - 107 mmol/L    Carbon Dioxide (CO2) 29 22 - 29 mmol/L    Anion Gap 8 7 - 15 mmol/L    Urea Nitrogen 22.1 8.0 - 23.0 mg/dL    Creatinine 0.76 0.51 - 0.95 mg/dL    Calcium 9.6 8.8 - 10.2 mg/dL    Glucose 95 70 - 99 mg/dL    Alkaline Phosphatase 116 (H) 35 - 104 U/L    AST 16 0 - 45 U/L      Comment:      Reference intervals for this test were updated on 6/12/2023 to more accurately reflect our healthy population. There may be differences in the flagging of prior results with similar values performed with this method. Interpretation of those prior results can be made in the context of  the updated reference intervals.    ALT 21 0 - 50 U/L      Comment:      Reference intervals for this test were updated on 6/12/2023 to more accurately reflect our healthy population. There may be differences in the flagging of prior results with similar values performed with this method. Interpretation of those prior results can be made in the context of the updated reference intervals.      Protein Total 6.6 6.4 - 8.3 g/dL    Albumin 4.3 3.5 - 5.2 g/dL    Bilirubin Total 0.3 <=1.2 mg/dL    GFR Estimate 89 >60 mL/min/1.73m2   Hemoglobin A1c   Result Value Ref Range    Hemoglobin A1C 5.4 0.0 - 5.6 %      Comment:      Normal <5.7%   Prediabetes 5.7-6.4%    Diabetes 6.5% or higher     Note: Adopted from ADA consensus guidelines.   CBC with platelets and differential   Result Value Ref Range    WBC Count 6.4 4.0 - 11.0 10e3/uL    RBC Count 4.81 3.80 - 5.20 10e6/uL    Hemoglobin 14.2 11.7 - 15.7 g/dL    Hematocrit 45.9 35.0 - 47.0 %    MCV 95 78 - 100 fL    MCH 29.5 26.5 - 33.0 pg    MCHC 30.9 (L) 31.5 - 36.5 g/dL    RDW 12.9 10.0 - 15.0 %    Platelet Count 241 150 - 450 10e3/uL    % Neutrophils 53 %    % Lymphocytes 36 %    % Monocytes 9 %    % Eosinophils 2 %    % Basophils 0 %    % Immature Granulocytes 0 %    Absolute Neutrophils 3.4 1.6 - 8.3 10e3/uL    Absolute Lymphocytes 2.3 0.8 - 5.3 10e3/uL    Absolute Monocytes 0.6 0.0 - 1.3 10e3/uL    Absolute Eosinophils 0.1 0.0 - 0.7 10e3/uL    Absolute Basophils 0.0 0.0 - 0.2 10e3/uL    Absolute Immature Granulocytes 0.0 <=0.4 10e3/uL       If you have any questions or concerns, please call the clinic at the number listed above.       Sincerely,      Camryn Graham, DO

## 2023-06-28 NOTE — PROGRESS NOTES
SUBJECTIVE:   CC: Chrissie is an 63 year old who presents for preventive health visit.     Healthy Habits:    Getting at least 3 servings of Calcium per day:  NO    Bi-annual eye exam:  Yes    Dental care twice a year:  Yes    Sleep apnea or symptoms of sleep apnea:  None    Diet:  Regular (no restrictions)    Frequency of exercise:  2-3 days/week    Duration of exercise:  15-30 minutes    Taking medications regularly:  Yes    Medication side effects:  Not applicable    PHQ-2 Total Score:    Have you ever done Advance Care Planning? (For example, a Health Directive, POLST, or a discussion with a medical provider or your loved ones about your wishes): No, advance care planning information given to patient to review.  Patient declined advance care planning discussion at this time.    Social History     Tobacco Use     Smoking status: Former     Packs/day: 1.00     Years: 25.00     Pack years: 25.00     Types: Cigarettes     Start date:      Quit date: 1996     Years since quittin.0     Smokeless tobacco: Never   Substance Use Topics     Alcohol use: Yes     Comment: 1/month             2023     9:33 AM   Alcohol Use   Prescreen: >3 drinks/day or >7 drinks/week? No           2021     6:54 AM 2022     9:50 AM 2022     7:54 AM 2022     6:47 AM 2023     9:34 AM   Breast CA Risk Assessment (FHS-7)   Did any of your first-degree relatives have breast or ovarian cancer? No Yes Yes Yes Yes   Did any of your relatives have bilateral breast cancer? No No No No No   Did any man in your family have breast cancer? No No No No No   Did any woman in your family have breast and ovarian cancer? No No No Yes Yes   Did any woman in your family have breast cancer before age 50 y? No No No No No   Do you have 2 or more relatives with breast and/or ovarian cancer? No Yes Yes No No   Do you have 2 or more relatives with breast and/or bowel cancer? No No No No No         Review of Systems  "  Constitutional: Negative for chills and fever.   HENT: Negative for congestion, ear pain, hearing loss and sore throat.    Eyes: Negative for pain and visual disturbance.   Respiratory: Negative for cough and shortness of breath.    Cardiovascular: Negative for chest pain, palpitations and peripheral edema.   Gastrointestinal: Negative for abdominal pain, constipation, diarrhea, heartburn, hematochezia and nausea.   Breasts:  Negative for tenderness, breast mass and discharge.   Genitourinary: Negative for dysuria, frequency, genital sores, hematuria, pelvic pain, urgency, vaginal bleeding and vaginal discharge.   Musculoskeletal: Negative for arthralgias, joint swelling and myalgias.   Skin: Negative for rash.   Neurological: Negative for dizziness, weakness, headaches and paresthesias.   Psychiatric/Behavioral: Negative for mood changes. The patient is not nervous/anxious.           OBJECTIVE:   /84 (BP Location: Right arm, Patient Position: Sitting, Cuff Size: Adult Regular)   Pulse 82   Temp 98.1  F (36.7  C)   Resp 18   Ht 1.676 m (5' 6\")   Wt 81 kg (178 lb 9.6 oz)   SpO2 100%   BMI 28.83 kg/m    Physical Exam    GENERAL APPEARANCE: AAOx3, no distress. Well developed.    RESP: Lungs CTA bilaterally. No w/r/r. No distress     CV: RRR, S1/S2 present. No m/r/c.     ABDOMEN:  soft, nontender, no distention. No rebound or guarding.     EXT: No c/c/e in lower extremities b/l. No rashes or deformities noted.    PSYCH: appropriate mood and affect.         ASSESSMENT/PLAN:   Dominique was seen today for physical.    Diagnoses and all orders for this visit:    Routine history and physical examination of adult   Update fasting labs   She reports total hysterectomy around age 47 due to family hx ovarian cancer. She states her ob/gyn at [facility] says she no longer needs further paps.   -     CBC with Platelets & Differential; Future  -     Comprehensive metabolic panel; Future  -     Hemoglobin A1c; " Future  -     Lipid panel reflex to direct LDL Fasting    Impaired fasting glucose  She admits to weight gain and poor eating habits due to stressful few months. Recheck a1c and consider restarting metformin if indicated  -     CBC with Platelets & Differential; Future  -     Comprehensive metabolic panel; Future  -     Hemoglobin A1c; Future  -     Lipid panel reflex to direct LDL Fasting    Mixed hyperlipidemia  On statin  -     CBC with Platelets & Differential; Future  -     Comprehensive metabolic panel; Future  -     Hemoglobin A1c; Future  -     Lipid panel reflex to direct LDL Fasting    Thyroid nodules   Following endo    Osteopenia, unspecified location  DXA due, hx osteopenia  -     DX Hip/Pelvis/Spine; Future      DO THIAGO Rojas [facility]

## 2023-06-29 ENCOUNTER — TELEPHONE (OUTPATIENT)
Dept: FAMILY MEDICINE | Facility: CLINIC | Age: 63
End: 2023-06-29
Payer: COMMERCIAL

## 2023-06-29 NOTE — TELEPHONE ENCOUNTER
Patient called in curious on her lab results from yesterday relayed numbers and she will wait for results with result notes to be mailed to her    No questions or concerns    Sandeep Armenta RN

## 2023-07-03 ENCOUNTER — TRANSFERRED RECORDS (OUTPATIENT)
Dept: HEALTH INFORMATION MANAGEMENT | Facility: CLINIC | Age: 63
End: 2023-07-03
Payer: COMMERCIAL

## 2023-07-05 ENCOUNTER — HOSPITAL ENCOUNTER (OUTPATIENT)
Dept: BONE DENSITY | Facility: CLINIC | Age: 63
Discharge: HOME | End: 2023-07-05
Attending: INTERNAL MEDICINE | Admitting: INTERNAL MEDICINE
Payer: COMMERCIAL

## 2023-07-05 DIAGNOSIS — M85.80 OSTEOPENIA, UNSPECIFIED LOCATION: ICD-10-CM

## 2023-07-05 PROCEDURE — 77080 DXA BONE DENSITY AXIAL: CPT

## 2023-07-31 ENCOUNTER — TRANSFERRED RECORDS (OUTPATIENT)
Dept: HEALTH INFORMATION MANAGEMENT | Facility: CLINIC | Age: 63
End: 2023-07-31
Payer: COMMERCIAL

## 2023-08-21 ENCOUNTER — APPOINTMENT (OUTPATIENT)
Dept: URBAN - METROPOLITAN AREA CLINIC 255 | Age: 63
Setting detail: DERMATOLOGY
End: 2023-08-25

## 2023-08-21 VITALS — WEIGHT: 179 LBS | HEIGHT: 66 IN

## 2023-08-21 PROBLEM — D48.5 NEOPLASM OF UNCERTAIN BEHAVIOR OF SKIN: Status: ACTIVE | Noted: 2023-08-21

## 2023-08-21 PROCEDURE — OTHER SEPARATE AND IDENTIFIABLE DOCUMENTATION: OTHER

## 2023-08-21 PROCEDURE — 99212 OFFICE O/P EST SF 10 MIN: CPT | Mod: 25

## 2023-08-21 PROCEDURE — OTHER COUNSELING: OTHER

## 2023-08-21 PROCEDURE — 11102 TANGNTL BX SKIN SINGLE LES: CPT

## 2023-08-21 PROCEDURE — OTHER SNIP BIOPSY: OTHER

## 2023-08-21 NOTE — PROCEDURE: COUNSELING
Detail Level: Detailed
Patient Specific Counseling (Will Not Stick From Patient To Patient): - Discussed and recommended snip removal due to uncertainty of diagnosis and symptoms.

## 2023-08-21 NOTE — HPI: SKIN LESIONS
How Severe Is Your Skin Lesion?: mild
Is This A New Presentation, Or A Follow-Up?: Growth
Additional History: Pt reports lesion was tender when it first showed up, but the inflammation is resolving since she made the appointment.

## 2023-10-04 ENCOUNTER — HOSPITAL ENCOUNTER (OUTPATIENT)
Dept: MAMMOGRAPHY | Facility: CLINIC | Age: 63
Discharge: HOME | End: 2023-10-04
Attending: NURSE PRACTITIONER | Admitting: NURSE PRACTITIONER
Payer: COMMERCIAL

## 2023-10-04 DIAGNOSIS — Z12.31 VISIT FOR SCREENING MAMMOGRAM: ICD-10-CM

## 2023-10-04 PROCEDURE — 77067 SCR MAMMO BI INCL CAD: CPT

## 2023-11-09 NOTE — PROGRESS NOTES
"New Medical Weight Management Consult    PATIENT:  Dominique Craft   MRN:         [id number]   :         1960  ENEDINA:         11/15/2023      Dear Camryn Graham MD,    I had the pleasure of seeing your patient, Dominique Craft. Full intake/assessment was done to determine barriers to weight loss success and develop a treatment plan. Dominique Craft is a 63 year old female interested in treatment of medical problems associated with excess weight. She has a height of 5' 6\", a weight of 206 lbs 0 oz, and the calculated Body mass index is 33.25 kg/m .    Assessment & Plan   Problem List Items Addressed This Visit       Elevated lipids     Discussed in clinic visit. Anticipate improvement with weight loss.  Continue Crestor from PCP         Class 1 obesity due to excess calories with serious comorbidity and body mass index (BMI) of 33.0 to 33.9 in adult - Primary     [facility] - referred for binge eating evaluation. If clinic approves, would favor Saxenda.             PROGRAM OVERVIEW  Reviewed options at [facility].   All questions were answered. Education provided on chronic disease management of obesity.    SURGICAL WEIGHT LOSS   N/A     MEDICATIONS:  We discussed healthy habits to assist with weight loss. We reviewed medications associated with weight gain. We discussed the role of pharmacological agents in the treatment of obesity and the \"off-label\" use of medications in this practice. We reviewed medication that may assist with weight loss. Indications, contraindications, risks/benefits, and potential side effects were discussed.   Discussed Saxenda if eating disorder clinic evaluation is positive and they are in agreement with medications. Discussed mechanism of action, common side effects, titration guidelines, and monitoring for pancreatitis/gallbladder problems/low sugars/MTC/MEN2. Medication handout given in AVS. Discussed that medications must always be used together with lifestyle " changes such as improvements in diet choices, portion control and establishing and maintaining a regular exercise program. Reviewed rationale for long term use of pharmacotherapy in chronic disease management for obesity.      AOM Considerations:  Phentermine:    Topiramate: hx of stones, Current birth control: hyesterectomy    GLP-1: wegovy, saxenda appear covered - she is familiar w/wegovy and researched it. Discussed     Naltrexone:    Wellbutrin:    Metformin: used previously for preDM   Contrave: appear covered  Qsymia: avoid d/t kidney stones        BP Readings from Last 6 Encounters:   11/15/23 114/73   06/28/23 121/84   12/20/22 110/72   12/16/22 115/77   12/07/22 110/72   11/15/22 114/75       Pulse Readings from Last 6 Encounters:   11/15/23 86   06/28/23 82   12/20/22 101   12/16/22 82   12/07/22 69   11/15/22 70         PATIENT INSTRUCTIONS:  Plan:  Eating Disorder evaluation needed for formal evaluation - if they approve we can look into starting medications as well - I'd recommend Saxenda.       Goals:  I recommend eating breakfast within an hour of waking up and eating every 4-6 hours during the day to keep your metabolism up. Prioritizing veggies and proteins for food choices is also recommended as medically appropriate.  Recommend 64oz water daily as medically appropriate (6-8 glasses)  Look into incorporating some resistance training/muscle bulding exercises.     FOLLOW-UP:    Please call [phone] to schedule your next visit.     56 minutes spent on the date of the encounter doing chart review, history and exam, review test results, counseling, developing plan of care, documentation, and further activities as noted above.       Weight Related Co-morbidities:          I have the following health issues associated with obesity: High cholesterol, prior preDM,    I have the following symptoms associated with obesity: Tired - uses energy drinks     Interested in finding something to help with the  weight gain/loss cycle. Was down to 165 lbs last year, can only achieve by pretty significant diet/exercise     Patient Active Problem List   Diagnosis    Esophageal reflux    Elevated lipids    Thyroid nodules    Epistaxis    Nephrolithiasis    History of pulmonary embolus (PE)    Impaired fasting glucose    Osteopenia    Class 1 obesity due to excess calories with serious comorbidity and body mass index (BMI) of 33.0 to 33.9 in adult         Weight History    Previously had weight loss surgery: no   Age pt became overweight:   Cycling since childhood - around 8-12 years old    The following factors contributed to weight gain:    Has cycled, had an injury last December and fell and broke wrist in two spots  - then fear with walking then broke left arm again when walked dog again in April    I have tried the following methods to lose weight:   Diet and exercise    My lowest weight since age 20 was: 110 lbs   My highest weight since age 20 was: 227 lbs   The most weight I have ever lost was: (lbs) 65 plus pounds    Family hx of obesity: no   Are you interested in learning about weight loss surgery if appropriate?  N/a   How has your weight changed over the last year?  Increased        Diet Recall     Wake Up: 3 am    Breakfast 4 am - egg wrap 1/4 c turkey sausage   Lunch Home: Grazing during the day - package of almond/cashew/cranberries x2, nutterbutter sandwhich,  If at office: light and fit yogurt, 1/4 c cl granola, powercrunch bars, slim mini beef jerky stick and a pack of string cheese.   Supper 4:30pm 1/2 of a petit steak and baked potato and salad    Bedtime:   Snack between meals:   Snack in evenin:30-6pm and goes to bed to avoid eating. Used to stay up till 7pm but would eat during that time frame so went to bed instead            Typical snacks: See above   Caloric beverages per day. What type:    Water consumed daily: Plain or carbonated - 2 cans of water and one-two bottle    Low elva/diet  drinks:   Alcohol:   Foods you crave:    Mini coke zero for caffeine    Not regularly - occasional Friday drink or special occasion.            Sleep History    How many hours do you sleep at night? 8 - 10   Do you think you have sleep apnea?  no   Do you snore so loudly some one can hear you through a closed door? no   Has anyone seen or heard you stop breathing during your sleep?  no   Do you often feel tired, fatigued, or sleepy during the day? yes         Stop bang score is 2      Who does the grocery shopping? Both self and    Who does the cooking? Self        Eating Disorder Screen    I binge eat Yes - feels like once she gets started she can't stop. Doesn't do purging. Lots of mindless eating.       I make myself vomit what I have eaten or use laxatives to get rid of food. no   I eat a large amount of food, like a loaf of bread, a box of cookies, a pint/quart of ice cream, all at once.    I eat a large amount of food even when I am not hungry.    I eat rapidly.    I eat alone because I feel embarrassed and do not want others to see how much I have eaten.    I eat until I am uncomfortably full.    I feel bad, disgusted, or guilty after I overeat.      Binge Eating Screening:  Objective binges at least 1x per week for the past 3 months. Could happen anytime eating   Patient senses lack of control over eating during the binges. Doesn't feel like she is able to find a middle ground with eating patterns   Binge eating causes stress. yes  Binge eating episodes are associated with 3 or more of the following:    Eating until uncomfortably full. yes  Eating large amounts when not physically hungry. yes  Eating much more rapidly than usual. yes  Eating alone due to being embarassed by the amount eaten. Yes - not necessarily by amount but more to avoid comments from    Feeling disgusted, depressed, or guilty after overeating. yes  If patient answers yes to 3 of the above questions and answered yes to  frequency, distress of eating behavior and avoids using compensatory behaviors, refer for binge eating assessment.    Referred for more formal evaluation.     Activity/Exercise History        How many times a week are you active for the purpose of exercise? For how long? Not much - could walk, can do yard raking. Gets about 10k steps in as well.    What do you do for exercise?     What keeps you from being more active?        Past Medical History:   Diagnosis Date    Anemia     DVT (deep venous thrombosis) (H)     after tummy tuck in , right leg    Epistaxis     ENT    Gallstones     s/p lisa    GERD (gastroesophageal reflux disease)     Hyperlipidemia     Insomnia     Nephrolithiasis     Obesity (BMI 30-39.9)     PE (pulmonary embolism)     after tummy tuch 2006    Plantar fibromatosis     Thyroid nodules     benign and biopsied, previously on Sythroid          Work/Social History Reviewed With Patient    Employment status is: Employed    Job is: Office/desk work   Is job active or sedentary? sedentary   Marital status?    Who do you live with?  Just  - two cats   If in a relationship, is your significant other overweight? no   Do you have children? Yes, and a granddaughter   If you have children, are they overweight? no   Are they supportive of your health goals? yes     Social History     Tobacco Use    Smoking status: Former     Packs/day: 1.00     Years: 25.00     Additional pack years: 0.00     Total pack years: 25.00     Types: Cigarettes     Start date:      Quit date: 1996     Years since quittin.4    Smokeless tobacco: Never   Vaping Use    Vaping Use: Never used   Substance Use Topics    Alcohol use: Yes     Comment: 1/month    Drug use: No        Mental Health History Reviewed With Patient    How often in the past 2 weeks have you felt little interest or pleasure in doing things? never                   Would you like to be connected with a therapist? Not right now  "      MEDICATIONS:   Current Outpatient Medications   Medication    Cholecalciferol (VITAMIN D-3) 125 MCG (5000 UT) TABS    cyanocobalamin (VITAMIN B-12) 1000 MCG SUBL sublingual tablet    rosuvastatin (CRESTOR) 10 MG tablet     No current facility-administered medications for this visit.        ALLERGIES:      Allergies   Allergen Reactions    Amoxicillin     Naproxen     Simvastatin         ROS:    HEENT  H/O glaucoma:  no  Cardiovascular  CAD:   no  Palpitations:   no  HTN:    no  Gastrointestinal  GERD:   no  Constipation:   Yes - not regularly but does have some bloating so occasional stool softeners   Gastroparesis:  no  Liver Dz:   no  H/O Pancreatitis:  no  H/O Gallbladder Dz: removed  Psychiatric  Moods Stable:  Yes/acceptable  Anxiety:   no  Depression:  no  Bipolar:  no  H/O ETOH/Drug Use no  H/O eating disorder: no  Endocrine  PMH/FMH of MTC or MEN2:  no  Neurologic:  H/O seizures:   no  Headaches:  no  Memory Impairment:  no    H/O kidney stones:  yes  Kidney disease:  no  Current birth control:  hyesterectomy    Labs/Records Reviewed:  No results found for: A1C, VITDT, TSH, NA, POTASSIUM, CHLORIDE, CO2, ANIONGAP, GLC, BUN, CR, GFRESTIMATED, FLEX, BILITOTAL, ALKPHOS, ALT, AST, CHOL, HDL, LDL, TRIG, WBC, HGB, HCT, MCV, PLT    Reviewed labs from  6/28/23 hgA1c, CMP, CBC, lipids. TSH checked 6/10/22    BP Readings from Last 6 Encounters:   11/15/23 114/73   06/28/23 121/84   12/20/22 110/72   12/16/22 115/77   12/07/22 110/72   11/15/22 114/75       Pulse Readings from Last 6 Encounters:   11/15/23 86   06/28/23 82   12/20/22 101   12/16/22 82   12/07/22 69   11/15/22 70        PHYSICAL EXAM:  /73   Pulse 86   Ht 5' 6\" (1.676 m)   Wt 206 lb (93.4 kg)   SpO2 99%   BMI 33.25 kg/m    GENERAL: Healthy, alert and no distress  EYES: Eyes grossly normal to inspection.    RESP: No audible wheeze, cough, or visible cyanosis.  No increased work of breathing. Lungs clear to auscultation  Heart: regular " rate and rhythm. No significant murmurs, rubs, or gallops  SKIN: Visible skin clear.   NEURO: Mentation and speech appropriate for age.  PSYCH: Mentation appears normal, affect normal/bright, judgement and insight intact, normal speech and appearance well-groomed.      COUNSELING:   Reviewed obesity as a chronic disease and comprehensive management stratagies.      We discussed Bariatric Basics including:  -eating 3 meals daily  -eating protein first    Weight Management Tips Handout given in AVS      We discussed the importance of restorative sleep and stress management in maintaining a healthy weight.  We discussed insulin resistance and glycemic index as it relates to appetite and weight control.   We discussed the importance of physical activity including cardiovascular and strength training in maintaining a healthier weight and explored viable options.  Patient education of above written in AVS.      Sincerely,      Holly Arenas PA-C

## 2023-11-15 ENCOUNTER — ALLIED HEALTH/NURSE VISIT (OUTPATIENT)
Dept: SURGERY | Facility: CLINIC | Age: 63
End: 2023-11-15
Payer: COMMERCIAL

## 2023-11-15 ENCOUNTER — OFFICE VISIT (OUTPATIENT)
Dept: SURGERY | Facility: CLINIC | Age: 63
End: 2023-11-15
Payer: COMMERCIAL

## 2023-11-15 VITALS
HEART RATE: 86 BPM | WEIGHT: 206 LBS | SYSTOLIC BLOOD PRESSURE: 114 MMHG | BODY MASS INDEX: 33.11 KG/M2 | DIASTOLIC BLOOD PRESSURE: 73 MMHG | HEIGHT: 66 IN | OXYGEN SATURATION: 99 %

## 2023-11-15 DIAGNOSIS — E66.09 CLASS 1 OBESITY DUE TO EXCESS CALORIES WITH SERIOUS COMORBIDITY AND BODY MASS INDEX (BMI) OF 33.0 TO 33.9 IN ADULT: Primary | ICD-10-CM

## 2023-11-15 DIAGNOSIS — E78.5 ELEVATED LIPIDS: ICD-10-CM

## 2023-11-15 DIAGNOSIS — E66.811 CLASS 1 OBESITY DUE TO EXCESS CALORIES WITH SERIOUS COMORBIDITY AND BODY MASS INDEX (BMI) OF 33.0 TO 33.9 IN ADULT: Primary | ICD-10-CM

## 2023-11-15 PROCEDURE — 99204 OFFICE O/P NEW MOD 45 MIN: CPT | Performed by: PHYSICIAN ASSISTANT

## 2023-11-15 NOTE — PATIENT INSTRUCTIONS
"To ensure quality you may receive a patient satisfaction survey. The greatest compliment you can give is \"Likely to Recommend.\"    Nice to talk with you today.  Thank you for your trust. Below is the plan discussed.-  Holly Arenas PA-C      Plan:  Eating Disorder evaluation needed for formal evaluation - if they approve we can look into starting medications as well - I'd recommend Saxenda.       Goals:  I recommend eating breakfast within an hour of waking up and eating every 4-6 hours during the day to keep your metabolism up. Prioritizing veggies and proteins for food choices is also recommended as medically appropriate.  Recommend 64oz water daily as medically appropriate (6-8 glasses)  Look into incorporating some resistance training/muscle bulding exercises.     FOLLOW-UP:    Please call [phone] to schedule your next visit.     SAXENDA (liraglutide)    Saxenda is a GLP-1 (Glucagon-like Peptide-1) medication.One of the ways it works is by slowing down the rate that food leaves your stomach. You feel paniagua and will eat less. It also helps regulate hormones that can help improve your blood sugars.    Dosing for this medication:   Week 1- Inject 0.6 mg daily  Week 2- Inject 1.2 mg daily  Week 3- Inject 1.8 mg daily (If hunger and portions controlled stay at this dose)  Week 4- Inject 2.4 mg daily  Week 5 and thereafter- Inject 3.0 mg daily    Side effects of GLP- Medications include: The most common side effects are all GI related and consist of: nausea, heartburn, constipation, diarrhea, burping, or gassiness. Patients are advised to eat slowly and less, and nausea typically passes if people can stick it out.     The risk of pancreatitis (inflammation of the pancreas) has been associated with this type of medication, but is very rare.  If you have had pancreatitis in the past, this medication may not be for you. Please let us know about any past history of pancreas problems.  Symptoms of pancreatitis " include: Pain in your upper stomach area which may travel to your back and be worse after eating. Your stomach area may be tender to the touch.  You may have vomiting or nausea and/or have a fever. If you should develop any of these symptoms, stop the medication and contact your primary care doctor. They will do a blood test to check for pancreatitis.       This medication is usually not covered by insurance and can be quite expensive. Sometimes a prior authorization is required, which may take up to 1-2 weeks for an insurance company to make a decision if they will cover the medication. Please be patient, you will be notified after a decision has been made.     (Do not stop taking it if you don't think it's working. For some people it works without them knowing it.)     In order to get refills of this or any medication we prescribe you must be seen in the medical weight mgmt clinic every 2-4 months.

## 2023-11-16 ENCOUNTER — OFFICE VISIT (OUTPATIENT)
Dept: ENDOCRINOLOGY | Facility: CLINIC | Age: 63
End: 2023-11-16
Payer: COMMERCIAL

## 2023-11-16 VITALS
WEIGHT: 206 LBS | SYSTOLIC BLOOD PRESSURE: 126 MMHG | BODY MASS INDEX: 33.25 KG/M2 | DIASTOLIC BLOOD PRESSURE: 84 MMHG | HEART RATE: 83 BPM | RESPIRATION RATE: 16 BRPM | OXYGEN SATURATION: 96 %

## 2023-11-16 DIAGNOSIS — E04.2 NON-TOXIC MULTINODULAR GOITER: Primary | ICD-10-CM

## 2023-11-16 PROCEDURE — 99213 OFFICE O/P EST LOW 20 MIN: CPT | Performed by: INTERNAL MEDICINE

## 2023-11-16 NOTE — Clinical Note
11/16/2023         RE: Dominique Craft  [address]  [facility] [address]        Dear Colleague,    Thank you for referring your patient, Dominique Craft, to the [facility] [place]. Please see a copy of my visit note below.    Recent issues:  Thyroid nodule follow-up evaluation  Reviewed medical history from patient and Epic chart record        ~2012. Diagnosis of thyroid gland problem, details not available  Previous medical evaluations with Dr Suzy Morales and Cornelia Brasher/Katia Flores  Recalls having elevated cholesterol level, then advised to start lipid medication  Took simvastatin medication and then noted throat symptoms  Reports looking at internet information that indicated simvastatin associated with thyroid problems  1/13/12 FNA report ([facility]):  LLL ((#8) and RML (#2) nodules- benign cytology.    Diagnosis of thyroid nodules, has seen Dr. Shane Portillo for endocrinology evaluations  Diagnosis of multinodular goiter and subacute thyroiditis, but limited medical records available   Has had thyroid U/S imaging previously  She recalls having FNA biopsies... of 5 different nodules, benign results     Took thyroid medication for approx 2 months, then discontinued  No subsequent thyroid med use  11/16/21 thyroid U/S ([facility]):  1.9 cm complex cystic nodule in isthmus  6/16/22 Thyroid U/S:   Right lobe 4.6 x 1.7 x 1.8 cm and left lobe 4.6 x 2.5 x 2.3 cm. Homogeneous echotexture.    1. RUL 1.0 x 0.8 x 0.8 cm solid nodule   2. RUL 1.1 x 0.9 x 0.5 cm solid nodule    3. RML-RLL 1.0 x 1.0 x 0.7 cm solid nodule    4. RLL 1.0 x 0.8 x 0.6 cm solid nodule    5. MARCUS-LML 1.0 x 0.8 x 0.6 cm solid nodule   6. LML 0.8 x 0.8 x 0.5 cm solid nodule    8. LLL 2.9 x 1.9 x 1.9 cm solid nodule    9. Isthmus 1.2 x 1.1 x 0.4 cm solid nodule    10. RML 1.2 x 0.6 x 1.0 cm cystic nodule, new                                                    Few mildly prominent cervical lymph nodes including  "0.8 x 0.6 x 0.5 cm lymph node in the right mid neck area with normal fatty hilum, indeterminate     Additional health history:  Neck radiation treatment: None  Fam Hx thyroid disease: Mother, sister- goiter, took \"radiation treatment\"    Previous [facility] thyroid tests include:     Lab Test 06/10/22  0742   TSH 0.66         11/15/22. Initial thyroid evaluation with me at [place]  Reviewed health history and thyroid nodule issues.  Recommended observation plan for the multiple thyroid nodules, follow-up thyroid U/S Fall '23    If significant size enlargement of thyroid nodules and size >1.5 cm diameter, consider repeat thyroid nodule FNA biopsy(s).    If neck discomfort related to the thyroid nodules, consider thyroid surgery consultation for partial vs total thyroidectomy surgery.    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 Camryn Graham/[facility] for general medicine evaluations.    PMH/PSH:  Past Medical History:   Diagnosis Date    Anemia     DVT (deep venous thrombosis) (H)     after tummy tuck in 2006, right leg    Epistaxis     ENT    Gallstones     s/p lisa    GERD (gastroesophageal reflux disease)     Hyperlipidemia     Insomnia     Nephrolithiasis     Obesity (BMI 30-39.9)     PE (pulmonary embolism)     after tummy tuch 2006    Plantar fibromatosis     Thyroid nodules     benign and biopsied, previously on Sythroid     Past Surgical History:   Procedure Laterality Date    APPENDECTOMY OPEN  01/01/1981    CHOLECYSTECTOMY  01/01/1993    COLONOSCOPY  03/28/2014    Procedure: COLONOSCOPY;  COLONOSCOPY (MAC SEDATION);  Surgeon: Thais Gregory MD;  Location:  GI    COSMETIC ABDOMINOPLASTY  2/2006 and 03/2007    tummy tuck and breast lift , revision of abdominoplasty in  2007    HYSTERECTOMY  01/01/2008    prophylactic due to FH of cancer    HYSTERECTOMY, PAP NO LONGER " INDICATED         Family Hx:  Family History   Problem Relation Age of Onset    Cancer Sister         ovarian cancer    Obesity Sister         s/p Vitor-en-y    Cancer Mother         kidney, colon and liver    Cancer - colorectal Mother          Social Hx:  Social History     Socioeconomic History    Marital status:      Spouse name: Not on file    Number of children: 0    Years of education: Not on file    Highest education level: Not on file   Occupational History    Occupation:      Employer: [employer]   Tobacco Use    Smoking status: Former     Packs/day: 1.00     Years: 25.00     Additional pack years: 0.00     Total pack years: 25.00     Types: Cigarettes     Start date:      Quit date: 1996     Years since quittin.4    Smokeless tobacco: Never   Vaping Use    Vaping Use: Never used   Substance and Sexual Activity    Alcohol use: Yes     Comment: 1/month    Drug use: No    Sexual activity: Yes     Partners: Male     Birth control/protection: Other     Comment: hysterectomy   Other Topics Concern     Service No    Blood Transfusions No    Caffeine Concern Yes    Occupational Exposure No    Hobby Hazards No    Sleep Concern Yes    Stress Concern Yes    Weight Concern Yes    Special Diet No    Back Care No    Exercise Yes    Bike Helmet No    Seat Belt Yes    Self-Exams Yes    Parent/sibling w/ CABG, MI or angioplasty before 65F 55M? No   Social History Narrative         Social Determinants of Health     Financial Resource Strain: Not on file   Food Insecurity: Not on file   Transportation Needs: Not on file   Physical Activity: Not on file   Stress: Not on file   Social Connections: Not on file   Interpersonal Safety: Not on file   Housing Stability: Not on file          MEDICATIONS:  has a current medication list which includes the following prescription(s): vitamin d-3, cyanocobalamin, and rosuvastatin.    ROS:     ROS: 10 point ROS neg other than the symptoms noted  above in the HPI.    GENERAL: some fatigue, wt stable; denies fevers, chills, night sweats.   HEENT: occasional rhaspy voice; denies dysphagia, odonophagia, diplopia, neck pain  THYROID:  no apparent hyper or hypothyroid symptoms  CV: no chest pain, pressure, palpitations  LUNGS: no SOB, SLOAN, cough, wheezing   ABDOMEN: some reflux; no diarrhea, constipation, abdominal pain  EXTREMITIES: no rashes, ulcers, edema  NEUROLOGY: no headaches, denies changes in vision, tingling, extremitiy numbness   MSK: pain at right middle finger s/p cat bite?; denies other arthralgias or muscle weakness  SKIN: no rashes or lesions  : no menses since hysterectomy mid 40's  PSYCH:  stable mood, no significant anxiety or depression  ENDOCRINE: no heat or cold intolerance    Physical Exam   VS: There were no vitals taken for this visit.  GENERAL: AXOX3, NAD, well dressed, answering questions appropriately, appears stated age.  THYROID:  Mildly enlarged thyroid with L>R asymmetry, size estimat 30 gm, no neck adenopathy noted  HEENT: neck non-tender, no exopthalmous, no proptosis, EOMI  CV: RRR, no rubs, gallops, no murmurs  LUNGS: CTAB, no wheezes, rales, or ronchi  ABDOMEN: mildly obese, soft, nontender, nondistended  EXTREMITIES: no edema, +pedal pulses, no lesions  NEUROLOGY: CN grossly intact, + DTR lower extremity, no tremors  MSK: grossly intact  SKIN: no rashes, no lesions    LABS:    All pertinent notes, labs, and images personally reviewed by me.     A/P:  No diagnosis found.      Comments:  Reviewed health history and thyroid nodule issues.  Difficult to understand the previous thyroid nodule evaluation and U/S imaging without medical recordes  Previous 6/2022 TSH level normal, indicating normal thyroid gland function    Plan:  Discussed general issues with the thyroid nodule diagnosis and management  We reviewed highlights of the patient's previous thyroid ultrasound information available  Discussed lab tests used to assess  patient thyroid hormone levels  Reviewed the thyroid nodule fine needle aspiration (FNA) biopsy option  We discussed treatment options with observation plan vs thyroid surgery     Recommend:  Continue the observation plan for the MNG at this time  Will request most recent progress notes, thyroid U/S and FNA reports from Dr. Portillo's office  Monitor for neck symptom changes  .xrad  Current plan for repeat thyroid U/S and TSH lab test in Fall '2023  If significant size enlargement of thyroid nodules and size >1.5 cm, consider repeat FNA biopsy(s)  If neck discomfort related to the MNG, consider thyroid surgery consultation for partial vs total thyroidectomy surgery  Will message patient with summary conclusions when these outside records available     Addressed patient questions today     There are no Patient Instructions on file for this visit.    Future labs ordered today: No orders of the defined types were placed in this encounter.    Radiology/Consults ordered today: None    Total time spent on day of encounter:  ***    Follow-up:  ***    MARI Dominguez MD, MS  Endocrinology  [facility]    CC: Cornelia Brasher     Recent issues:  Thyroid nodule follow-up evaluation  Reviewed medical history from patient and [facility] chart record        ~2012. Diagnosis of thyroid gland problem, details not available  Previous medical evaluations with Dr Suzy Morales and Cornelia Brasher/Katia Flores  Recalls having elevated cholesterol level, then advised to start lipid medication  Took simvastatin medication and then noted throat symptoms  Reports looking at internet information that indicated simvastatin associated with thyroid problems  1/13/12 FNA report ([place] [facility]):  LLL ((#8) and RML (#2) nodules- benign cytology.    Diagnosis of thyroid nodules, has seen Dr. Shane Portillo for endocrinology evaluations  Diagnosis of multinodular goiter and subacute thyroiditis, but limited medical records available   Has had thyroid U/S  "imaging previously  She recalls having FNA biopsies... of 5 different nodules, benign results     Took thyroid medication for approx 2 months, then discontinued  No subsequent thyroid med use  11/16/21 thyroid U/S ([facility]):  1.9 cm complex cystic nodule in isthmus  6/16/22 Thyroid U/S:   Right lobe 4.6 x 1.7 x 1.8 cm and left lobe 4.6 x 2.5 x 2.3 cm. Homogeneous echotexture.    1. RUL 1.0 x 0.8 x 0.8 cm solid nodule   2. RUL 1.1 x 0.9 x 0.5 cm solid nodule    3. RML-RLL 1.0 x 1.0 x 0.7 cm solid nodule    4. RLL 1.0 x 0.8 x 0.6 cm solid nodule    5. MARCUS-LML 1.0 x 0.8 x 0.6 cm solid nodule   6. LML 0.8 x 0.8 x 0.5 cm solid nodule    8. LLL 2.9 x 1.9 x 1.9 cm solid nodule    9. Isthmus 1.2 x 1.1 x 0.4 cm solid nodule    10. RML 1.2 x 0.6 x 1.0 cm cystic nodule, new                                                    Few mildly prominent cervical lymph nodes including 0.8 x 0.6 x 0.5 cm lymph node in the right mid neck area with normal fatty hilum, indeterminate     Additional health history:  Neck radiation treatment: None  Fam Hx thyroid disease: Mother, sister- goiter, took \"radiation treatment\"    Previous [facility] thyroid tests include:     Lab Test 06/10/22  0742   TSH 0.66         11/15/22. Initial thyroid evaluation with me at [place]  Reviewed health history and thyroid nodule issues.  Recommended observation plan for the multiple thyroid nodules, follow-up thyroid U/S Fall '23    If significant size enlargement of thyroid nodules and size >1.5 cm diameter, consider repeat thyroid nodule FNA biopsy(s).    If neck discomfort related to the thyroid nodules, consider thyroid surgery consultation for partial vs total thyroidectomy surgery.    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 Camryn Graham/[facility] for general medicine evaluations.    PMH/PSH:  Past Medical History:   Diagnosis Date "     Anemia      DVT (deep venous thrombosis) (H)     after tummy tuck in , right leg     Epistaxis     ENT     Gallstones     s/p lisa     GERD (gastroesophageal reflux disease)      Hyperlipidemia      Insomnia      Nephrolithiasis      Obesity (BMI 30-39.9)      PE (pulmonary embolism)     after tummy tuch 2006     Plantar fibromatosis      Thyroid nodules     benign and biopsied, previously on Sythroid     Past Surgical History:   Procedure Laterality Date     APPENDECTOMY OPEN  1981     CHOLECYSTECTOMY  1993     COLONOSCOPY  2014    Procedure: COLONOSCOPY;  COLONOSCOPY (MAC SEDATION);  Surgeon: Thais Gregory MD;  Location: [facility]     COSMETIC ABDOMINOPLASTY  2006 and 2007    tummy tuck and breast lift , revision of abdominoplasty in       HYSTERECTOMY  2008    prophylactic due to FH of cancer     HYSTERECTOMY, PAP NO LONGER INDICATED         Family Hx:  Family History   Problem Relation Age of Onset     Cancer Sister         ovarian cancer     Obesity Sister         s/p Vitor-en-y     Cancer Mother         kidney, colon and liver     Cancer - colorectal Mother          Social Hx:  Social History     Socioeconomic History     Marital status:      Spouse name: Not on file     Number of children: 0     Years of education: Not on file     Highest education level: Not on file   Occupational History     Occupation:      Employer: [employer]   Tobacco Use     Smoking status: Former     Packs/day: 1.00     Years: 25.00     Additional pack years: 0.00     Total pack years: 25.00     Types: Cigarettes     Start date:      Quit date: 1996     Years since quittin.4     Smokeless tobacco: Never   Vaping Use     Vaping Use: Never used   Substance and Sexual Activity     Alcohol use: Yes     Comment: 1/month     Drug use: No     Sexual activity: Yes     Partners: Male     Birth control/protection: Other     Comment: hysterectomy   Other Topics Concern       Service No     Blood Transfusions No     Caffeine Concern Yes     Occupational Exposure No     Hobby Hazards No     Sleep Concern Yes     Stress Concern Yes     Weight Concern Yes     Special Diet No     Back Care No     Exercise Yes     Bike Helmet No     Seat Belt Yes     Self-Exams Yes     Parent/sibling w/ CABG, MI or angioplasty before 65F 55M? No   Social History Narrative         Social Determinants of Health     Financial Resource Strain: Not on file   Food Insecurity: Not on file   Transportation Needs: Not on file   Physical Activity: Not on file   Stress: Not on file   Social Connections: Not on file   Interpersonal Safety: Not on file   Housing Stability: Not on file          MEDICATIONS:  has a current medication list which includes the following prescription(s): vitamin d-3, cyanocobalamin, and rosuvastatin.    ROS:     ROS: 10 point ROS neg other than the symptoms noted above in the HPI.    GENERAL: some fatigue, wt stable; denies fevers, chills, night sweats.   HEENT: occasional rhaspy voice; denies dysphagia, odonophagia, diplopia, neck pain  THYROID:  no apparent hyper or hypothyroid symptoms  CV: no chest pain, pressure, palpitations  LUNGS: no SOB, SLOAN, cough, wheezing   ABDOMEN: some reflux; no diarrhea, constipation, abdominal pain  EXTREMITIES: no rashes, ulcers, edema  NEUROLOGY: no headaches, denies changes in vision, tingling, extremitiy numbness   MSK: pain at right middle finger s/p cat bite?; denies other arthralgias or muscle weakness  SKIN: no rashes or lesions  : no menses since hysterectomy mid 40's  PSYCH:  stable mood, no significant anxiety or depression  ENDOCRINE: no heat or cold intolerance    Physical Exam   VS: /84 (BP Location: Right arm, Patient Position: Sitting, Cuff Size: Adult Regular)   Pulse 83   Resp 16   Wt 93.4 kg (206 lb)   SpO2 96%   BMI 33.25 kg/m    GENERAL: AXOX3, NAD, well dressed, answering questions appropriately, appears stated  age.  THYROID:  Mildly enlarged thyroid with L>R asymmetry, size estimate 30 gm, no neck adenopathy noted  HEENT: neck non-tender, no exopthalmous, no proptosis, EOMI  CV: RRR, no rubs, gallops, no murmurs  LUNGS: CTAB, no wheezes, rales, or ronchi  ABDOMEN: mildly obese, soft, nontender, nondistended  EXTREMITIES: no edema, +pedal pulses, no lesions  NEUROLOGY: CN grossly intact, + DTR lower extremity, no tremors  MSK: grossly intact  SKIN: no rashes, no lesions    LABS:    All pertinent notes, labs, and images personally reviewed by me.     A/P:  Encounter Diagnosis   Name Primary?     Non-toxic multinodular goiter Yes       Comments:  Reviewed health history and complicated thyroid nodule issues.  Reviewed and interpreted tests that I previously ordered.   Ordered appropriate tests for the endocrinology disease management.    Management options discussed and implemented after shared medical decision making with the patient.  MNG problem is chronic-stable     Plan:  Discussed general issues with the thyroid nodule diagnosis and management  We reviewed highlights of the patient's previous thyroid ultrasound information available  Discussed lab tests used to assess patient thyroid hormone levels  Reviewed the thyroid nodule fine needle aspiration (FNA) biopsy option  We discussed treatment options with observation plan vs thyroid surgery     Recommend:  Continue the observation plan for the MNG at this time  Monitor for neck symptom changes  Advise repeat thyroid lab testing, also thyroid U/S imaging   Lab and procedure orders placed  If significant size enlargement of thyroid nodules and size >1.5 cm, consider repeat FNA biopsy(s)  If neck discomfort related to the MNG, consider thyroid surgery consultation for partial vs total thyroidectomy surgery  Thyroid sketch given to patient today     Addressed patient questions today     There are no Patient Instructions on file for this visit.    Future labs ordered today:    Orders Placed This Encounter   Procedures     US Thyroid     TSH with free T4 reflex     Thyroid peroxidase antibody     Radiology/Consults ordered today: US THYROID    Total time spent on day of encounter:  26 min    Follow-up:  11/2024, Return    MARI Dominguez MD, MS  Endocrinology  [facility]        Again, thank you for allowing me to participate in the care of your patient.        Sincerely,        Zaki Dominguez MD

## 2023-11-16 NOTE — NURSING NOTE
Chief Complaint   Patient presents with    Follow Up     Non-toxic multinodular goiter       Vitals:    11/16/23 1015   BP: 126/84   BP Location: Right arm   Patient Position: Sitting   Cuff Size: Adult Regular   Pulse: 83   Resp: 16   SpO2: 96%   Weight: 93.4 kg (206 lb)       Body mass index is 33.25 kg/m .

## 2023-11-16 NOTE — PROGRESS NOTES
"Recent issues:  Thyroid nodule follow-up evaluation  Reviewed medical history from patient and Epic chart record        ~2012. Diagnosis of thyroid gland problem, details not available  Previous medical evaluations with Dr Suzy Morales and Cornelia Brasher/Katia Flores  Recalls having elevated cholesterol level, then advised to start lipid medication  Took simvastatin medication and then noted throat symptoms  Reports looking at internet information that indicated simvastatin associated with thyroid problems  1/13/12 FNA report ([facility]):  LLL ((#8) and RML (#2) nodules- benign cytology.    Diagnosis of thyroid nodules, has seen Dr. Shane Portillo for endocrinology evaluations  Diagnosis of multinodular goiter and subacute thyroiditis, but limited medical records available   Has had thyroid U/S imaging previously  She recalls having FNA biopsies... of 5 different nodules, benign results     Took thyroid medication for approx 2 months, then discontinued  No subsequent thyroid med use  11/16/21 thyroid U/S ([facility]D):  1.9 cm complex cystic nodule in isthmus  6/16/22 Thyroid U/S:   Right lobe 4.6 x 1.7 x 1.8 cm and left lobe 4.6 x 2.5 x 2.3 cm. Homogeneous echotexture.    1. RUL 1.0 x 0.8 x 0.8 cm solid nodule   2. RUL 1.1 x 0.9 x 0.5 cm solid nodule    3. RML-RLL 1.0 x 1.0 x 0.7 cm solid nodule    4. RLL 1.0 x 0.8 x 0.6 cm solid nodule    5. MARCUS-LML 1.0 x 0.8 x 0.6 cm solid nodule   6. LML 0.8 x 0.8 x 0.5 cm solid nodule    8. LLL 2.9 x 1.9 x 1.9 cm solid nodule    9. Isthmus 1.2 x 1.1 x 0.4 cm solid nodule    10. RML 1.2 x 0.6 x 1.0 cm cystic nodule, new                                                    Few mildly prominent cervical lymph nodes including 0.8 x 0.6 x 0.5 cm lymph node in the right mid neck area with normal fatty hilum, indeterminate     Additional health history:  Neck radiation treatment: None  Fam Hx thyroid disease: Mother, sister- goiter, took \"radiation treatment\"    Previous [facility] thyroid tests " include:     Lab Test 06/10/22  0742   TSH 0.66         11/15/22. Initial thyroid evaluation with me at [facility]  Reviewed health history and thyroid nodule issues.  Recommended observation plan for the multiple thyroid nodules, follow-up thyroid U/S Fall '23    If significant size enlargement of thyroid nodules and size >1.5 cm diameter, consider repeat thyroid nodule FNA biopsy(s).    If neck discomfort related to the thyroid nodules, consider thyroid surgery consultation for partial vs total thyroidectomy surgery.    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 Camryn Graham/[facility] for general medicine evaluations.    PMH/PSH:  Past Medical History:   Diagnosis Date    Anemia     DVT (deep venous thrombosis) (H)     after tummy tuck in 2006, right leg    Epistaxis     ENT    Gallstones     s/p lisa    GERD (gastroesophageal reflux disease)     Hyperlipidemia     Insomnia     Nephrolithiasis     Obesity (BMI 30-39.9)     PE (pulmonary embolism)     after tummy tuch 2006    Plantar fibromatosis     Thyroid nodules     benign and biopsied, previously on Sythroid     Past Surgical History:   Procedure Laterality Date    APPENDECTOMY OPEN  01/01/1981    CHOLECYSTECTOMY  01/01/1993    COLONOSCOPY  03/28/2014    Procedure: COLONOSCOPY;  COLONOSCOPY (MAC SEDATION);  Surgeon: Thais Gregory MD;  Location:  GI    COSMETIC ABDOMINOPLASTY  2/2006 and 03/2007    tummy tuck and breast lift , revision of abdominoplasty in  2007    HYSTERECTOMY  01/01/2008    prophylactic due to FH of cancer    HYSTERECTOMY, PAP NO LONGER INDICATED         Family Hx:  Family History   Problem Relation Age of Onset    Cancer Sister         ovarian cancer    Obesity Sister         s/p Vitor-en-y    Cancer Mother         kidney, colon and liver    Cancer - colorectal Mother          Social Hx:  Social History      Socioeconomic History    Marital status:      Spouse name: Not on file    Number of children: 0    Years of education: Not on file    Highest education level: Not on file   Occupational History    Occupation:      Employer: [employer]   Tobacco Use    Smoking status: Former     Packs/day: 1.00     Years: 25.00     Additional pack years: 0.00     Total pack years: 25.00     Types: Cigarettes     Start date:      Quit date: 1996     Years since quittin.4    Smokeless tobacco: Never   Vaping Use    Vaping Use: Never used   Substance and Sexual Activity    Alcohol use: Yes     Comment: 1/month    Drug use: No    Sexual activity: Yes     Partners: Male     Birth control/protection: Other     Comment: hysterectomy   Other Topics Concern     Service No    Blood Transfusions No    Caffeine Concern Yes    Occupational Exposure No    Hobby Hazards No    Sleep Concern Yes    Stress Concern Yes    Weight Concern Yes    Special Diet No    Back Care No    Exercise Yes    Bike Helmet No    Seat Belt Yes    Self-Exams Yes    Parent/sibling w/ CABG, MI or angioplasty before 65F 55M? No   Social History Narrative         Social Determinants of Health     Financial Resource Strain: Not on file   Food Insecurity: Not on file   Transportation Needs: Not on file   Physical Activity: Not on file   Stress: Not on file   Social Connections: Not on file   Interpersonal Safety: Not on file   Housing Stability: Not on file          MEDICATIONS:  has a current medication list which includes the following prescription(s): vitamin d-3, cyanocobalamin, and rosuvastatin.    ROS:     ROS: 10 point ROS neg other than the symptoms noted above in the HPI.    GENERAL: some fatigue, wt stable; denies fevers, chills, night sweats.   HEENT: occasional rhaspy voice; denies dysphagia, odonophagia, diplopia, neck pain  THYROID:  no apparent hyper or hypothyroid symptoms  CV: no chest pain, pressure,  palpitations  LUNGS: no SOB, SLOAN, cough, wheezing   ABDOMEN: some reflux; no diarrhea, constipation, abdominal pain  EXTREMITIES: no rashes, ulcers, edema  NEUROLOGY: no headaches, denies changes in vision, tingling, extremitiy numbness   MSK: pain at right middle finger s/p cat bite?; denies other arthralgias or muscle weakness  SKIN: no rashes or lesions  : no menses since hysterectomy mid 40's  PSYCH:  stable mood, no significant anxiety or depression  ENDOCRINE: no heat or cold intolerance    Physical Exam   VS: /84 (BP Location: Right arm, Patient Position: Sitting, Cuff Size: Adult Regular)   Pulse 83   Resp 16   Wt 93.4 kg (206 lb)   SpO2 96%   BMI 33.25 kg/m    GENERAL: AXOX3, NAD, well dressed, answering questions appropriately, appears stated age.  THYROID:  Mildly enlarged thyroid with L>R asymmetry, size estimate 30 gm, no neck adenopathy noted  HEENT: neck non-tender, no exopthalmous, no proptosis, EOMI  CV: RRR, no rubs, gallops, no murmurs  LUNGS: CTAB, no wheezes, rales, or ronchi  ABDOMEN: mildly obese, soft, nontender, nondistended  EXTREMITIES: no edema, no lesions  NEUROLOGY: CN grossly intact, no tremors  MSK: grossly intact  SKIN: no rashes, no lesions    LABS:    All pertinent notes, labs, and images personally reviewed by me.     A/P:  Encounter Diagnosis   Name Primary?    Non-toxic multinodular goiter Yes       Comments:  Reviewed health history and complicated thyroid nodule issues.  Reviewed and interpreted tests that I previously ordered.   Ordered appropriate tests for the endocrinology disease management.    Management options discussed and implemented after shared medical decision making with the patient.  MNG problem is chronic-stable     Plan:  Discussed general issues with the thyroid nodule diagnosis and management  We reviewed highlights of the patient's previous thyroid ultrasound information available  Discussed lab tests used to assess patient thyroid hormone  levels  Reviewed the thyroid nodule fine needle aspiration (FNA) biopsy option  We discussed treatment options with observation plan vs thyroid surgery     Recommend:  Continue the observation plan for the MNG at this time  Monitor for neck symptom changes  Advise repeat thyroid lab testing, also thyroid U/S imaging   Lab and procedure orders placed  If significant size enlargement of thyroid nodules and size >1.5 cm, consider repeat FNA biopsy(s)  If neck discomfort related to the MNG, consider thyroid surgery consultation for partial vs total thyroidectomy surgery  Thyroid sketch given to patient today     Addressed patient questions today     There are no Patient Instructions on file for this visit.    Future labs ordered today:   Orders Placed This Encounter   Procedures    US Thyroid    TSH with free T4 reflex    Thyroid peroxidase antibody     Radiology/Consults ordered today: US THYROID    Total time spent on day of encounter:  26 min    Follow-up:  11/2024, Return    MARI Dominguez MD, MS  Endocrinology  [facility]

## 2023-11-20 ENCOUNTER — ANCILLARY PROCEDURE (OUTPATIENT)
Dept: ULTRASOUND IMAGING | Facility: CLINIC | Age: 63
End: 2023-11-20
Attending: INTERNAL MEDICINE
Payer: COMMERCIAL

## 2023-11-20 ENCOUNTER — LAB (OUTPATIENT)
Dept: LAB | Facility: CLINIC | Age: 63
End: 2023-11-20
Payer: COMMERCIAL

## 2023-11-20 ENCOUNTER — TRANSFERRED RECORDS (OUTPATIENT)
Dept: SURGERY | Facility: CLINIC | Age: 63
End: 2023-11-20

## 2023-11-20 ENCOUNTER — TELEPHONE (OUTPATIENT)
Dept: SURGERY | Facility: CLINIC | Age: 63
End: 2023-11-20

## 2023-11-20 DIAGNOSIS — E04.2 NON-TOXIC MULTINODULAR GOITER: ICD-10-CM

## 2023-11-20 DIAGNOSIS — E66.09 CLASS 1 OBESITY DUE TO EXCESS CALORIES WITH SERIOUS COMORBIDITY AND BODY MASS INDEX (BMI) OF 33.0 TO 33.9 IN ADULT: Primary | ICD-10-CM

## 2023-11-20 DIAGNOSIS — E66.811 CLASS 1 OBESITY DUE TO EXCESS CALORIES WITH SERIOUS COMORBIDITY AND BODY MASS INDEX (BMI) OF 33.0 TO 33.9 IN ADULT: Primary | ICD-10-CM

## 2023-11-20 LAB — TSH SERPL DL<=0.005 MIU/L-ACNC: 0.9 UIU/ML (ref 0.3–4.2)

## 2023-11-20 PROCEDURE — 86376 MICROSOMAL ANTIBODY EACH: CPT

## 2023-11-20 PROCEDURE — 36415 COLL VENOUS BLD VENIPUNCTURE: CPT

## 2023-11-20 PROCEDURE — 76536 US EXAM OF HEAD AND NECK: CPT

## 2023-11-20 PROCEDURE — 84443 ASSAY THYROID STIM HORMONE: CPT

## 2023-11-20 NOTE — TELEPHONE ENCOUNTER
Received incoming call from Kate Hurst RD with [facility] regarding ED eval.     States that pt has met her for an eating disorder evaluation.   In RD's opinion, pt did not find evidence of disordered eating or behaviors such as binging, purging, emphasis on body image/perception.  More so found history of weight cycling.  AOMs not an issue for pt from diet perspective.    Writer asked if a letter summarizing findings could be written an faxed to clinic.  RD agreed to plan. Fax number given.  Confirmed pt has an JOSSY signed- RD will also fax this over.    Will route to provider as an FYI.    Eva Villela RN

## 2023-11-21 LAB — THYROPEROXIDASE AB SERPL-ACNC: <10 IU/ML

## 2023-11-21 NOTE — PATIENT INSTRUCTIONS
MEDICATION STARTED AT THIS APPOINTMENT    We are starting Phentermine. Take one tablet in the morning. Contact the nurse via [employer] or call [phone] if you have any questions or concerns. (Do not stop taking it if you don't think it's working. For some people it works without them knowing it.)    Phentermine is being prescribed because you identified hunger as one of the main causes for your extra weight.      Our patients on Phentermine find that they:    >feel less hunger    >find it easier to push the plate away   >have an easier time eating less    For some of our patients, these feelings are very real and immediate. For other patients, the feelings are less obvious. They don't feel much of a change but find they've lost weight. Like all weight loss medications, Phentermine  works best when you help it work. This means:  1. Having less tempting high calorie (fattening) food around the house or office. (For people with strong cravings this is very important.)   2. Staying away from situations or people that may trigger your cravings .   3. Eating out only one time or less each week.  4. Eating your meals at a table with the TV or computer off.    Side-effects. Phentermine is generally well tolerated. The main side-effects we see are feelings of racing pulse or rapid heart beat. Some people can get an elevated blood pressure. Because of this we may have you come back within a week or so of starting the medication for a blood pressure check.         In order to get refills of this or any medication we prescribe you must be seen in the medical weight mgmt clinic every 2-3 months.      MEDICATION STARTED AT THIS APPOINTMENT    We are starting a GLP-1 (Glucagon-like Peptide-1) medication called Saxenda. One of the ways it works is by slowing down the rate that food leaves your stomach. You feel paniagua and will eat less. It also helps regulate hormones that can help improve your blood sugars.    If you are a  patient that checks blood sugars, continue to check as instructed by your doctor. Low blood sugars are rare but can happen if patients are on insulin or other oral agents. If you notice consistent low sugars or high sugars, your medication may need to be adjusted after your appointment. If this is the case, please call RN and provide her your blood sugar record from the last 3-4 days. The RN will get in touch with the doctor and call you back/Mychart message with recommendations. We tolerate high sugars for a bit, so if sugars are running 180-200, this is ok. As weight starts dropping the blood sugars should too. If readings are consistently over 200 for 1-2 weeks, then you should call the doctor/nurse.    Dosing for this medication:   Week 1- Inject 0.6 mg daily  Week 2- Inject 1.2 mg daily  Week 3- Inject 1.8 mg daily  Week 4- Inject 2.4 mg daily  Week 5 and thereafter- Inject 3.0 mg daily    Side effects of GLP- Medications include: The most common side effects are all GI related and consist of: nausea, constipation, diarrhea, burping, or gassiness. Patients are advised to eat slowly and less, and nausea typically passes if people can stick it out.     The risk of pancreatitis (inflammation of the pancreas) has been associated with this type of medication, but is very rare.  If you have had pancreatitis in the past, this medication may not be for you. Please let us know about any past history of pancreas problems.    Symptoms of pancreatitis include: Pain in your upper stomach area which may travel to your back and be worse after eating. Your stomach area may be tender to the touch.  You may have vomiting or nausea and/or have a fever. If you should develop any of these symptoms, stop the medication and contact your primary care doctor. They will do a blood test to check for pancreatitis.         There is a small chance you may have some low blood sugar after taking the medication.   The signs of low blood sugar  are:  Weakness  Shaky   Hungry  Sweating  Confusion      See below for ways to treat low blood sugar without adding in lots of extra calories.      Treating Low Blood Sugar    If you have symptoms of low blood sugar (sweating, shaking, dizzy, confused) eat 15 grams of carbs and wait 15 minutes:    Glucose Tabs are best for sugars under 70 -  Dex4 or BD Glucose tablets are good, you will need to take 3-4 of these to equal 15 grams.     One small box of raisins  4 oz fruit juice box or   cup fruit juice  1 small apple  1 small banana    cup canned fruit in water    English muffin or a slice of bread with jelly   1 low fat frozen waffle with sugar-free syrup    cup cottage cheese with   cup frozen or fresh blueberries  1 cup skim or low-fat milk    cup whole grain cereal  4-6 crackers such as Triscuits      This medication is usually not covered by insurance and can be quite expensive. Sometimes a prior authorization is required, which may take up to 1-2 weeks for an insurance company to make a decision if they will cover the medication. Please be patient, you will be notified after a decision has been made.    Contact the nurse via [employer] or call [phone] if you have any questions or concerns. (Do not stop taking it if you don't think it's working. For some people it works without them knowing it.)     In order to get refills of this or any medication we prescribe you must be seen in the medical weight mgmt clinic every 2-4 months.

## 2023-11-21 NOTE — TELEPHONE ENCOUNTER
BP Readings from Last 6 Encounters:   11/16/23 126/84   11/15/23 114/73   06/28/23 121/84   12/20/22 110/72   12/16/22 115/77   12/07/22 110/72       Pulse Readings from Last 6 Encounters:   11/16/23 83   11/15/23 86   06/28/23 82   12/20/22 101   12/16/22 82   12/07/22 69     Called and discussed medication options with the patient. She is interested in starting Saxenda once available. Aware of the shortages.    Discussed phentermine as back up option - denies CAD and reviewed BP/pulse as above. Reviewed - CT coronary with score of 0 is reassuring regarding CAD. Discussed it is a stimulant, controlled substance, and needing BP/HR recheck 2 weeks after starting. Plan to start 8 mg Lomaira/phentermine and pt aware she can split it in half as well.    Does not have MyChart so staff will print and mail Medication handout to her.     CHACHO ZacariasC

## 2023-11-22 ENCOUNTER — TELEPHONE (OUTPATIENT)
Dept: SURGERY | Facility: CLINIC | Age: 63
End: 2023-11-22
Payer: COMMERCIAL

## 2023-11-22 NOTE — TELEPHONE ENCOUNTER
Went through denial rationale with provider.  Insurance appears to prefer pt to be on one weight loss medication at a time.  Per KW- pt can use phentermine alone at this time.  At a later date, can potentially appeal for a GLP-1 if not much success with phentermine.  Will contact pt to inform her of this as is not a [facility] user.    Eva PETERS RN

## 2023-11-22 NOTE — TELEPHONE ENCOUNTER
PRIOR AUTHORIZATION DENIED    Medication: LIRAGLUTIDE -WEIGHT MANAGEMENT 18 MG/3ML SC SOPN  Insurance Company: [employer] - Phone [phone] Fax [phone]  Denial Date: 11/22/2023  Denial Rational:     Appeal Information:     Patient Notified: No

## 2023-11-22 NOTE — TELEPHONE ENCOUNTER
Called pt to inform her that the Saxenda medication was denied by insurance, but the phentermine is covered.    Per KW, plan is to have pt start with the phentermine alone and see how this goes.  Informed pt that our office can try to appeal this decision at a later time if the phentermine does not help much with hunger control or weight loss.  Pt expressed understanding and reports she will get her blood pressure checked 2 weeks after starting the medication.  Informed her that written instructions for the phentermine were placed in the mail yesterday.    Patient is agreeable to plan moving forward and will contact clinic if any questions.    Eva Villela RN

## 2023-12-06 ENCOUNTER — TELEPHONE (OUTPATIENT)
Dept: SURGERY | Facility: CLINIC | Age: 63
End: 2023-12-06
Payer: COMMERCIAL

## 2023-12-06 NOTE — TELEPHONE ENCOUNTER
Pt started Lomaira 11/21/23.  Requested to have pt get BP and HR done in 2 weeks after starting.  Pt called today to report BP - states BP was checked at work by NP 12/5/23  yesterday and was 1116/82 and  HR with Fit Bit was 82.    Will update provider.  If provider has any recs will contact pt o/w  expect no follow up.   Will ask if wants to try for Saxenda now that it is available.  Suzy Kay, MS, RD, RN

## 2024-01-10 NOTE — PROGRESS NOTES
2024      Return Medical Weight Management Note     Dominique Craft  MRN:  [id number]  :  1960    Assessment & Plan   Problem List Items Addressed This Visit       Class 1 obesity due to excess calories with serious comorbidity and body mass index (BMI) of 33.0 to 33.9 in adult - Primary     Patient was congratulated on wt loss success thus far. Healthy habits to assist with further weight loss were discussed. Chrissie will continue phentermine and increase to 15 mg. Plan to try for GLP/GIP in one month pending phentermine response           Relevant Medications    phentermine (ADIPEX-P) 15 MG capsule      AOM Considerations:  Phentermine:  lomaira started 23   Topiramate: hx of stones, Current birth control: hyesterectomy          GLP-1: wegovy, saxenda appear covered - she is familiar w/wegovy and researched it. Discussed        Naltrexone:        Wellbutrin:         Metformin: used previously for preDM             Contrave: appear covered  Qsymia: avoid d/t kidney stones                                                PATIENT INSTRUCTIONS:  Plan:  Increase phentermine to 15 mg once daily in the mornings.  We will touch base by phone around  to assess progress and if needing to switch and retry for Saxenda/Wegovy (or another injectable).      Goals:  Focus on healthy food choices - prioritizing protein and veggies in your meals. I recommend eating every 4-6 hours to reduce the risk of low blood sugars and keep your metabolism up during the day.  Great job with exercising - please continue to invest in yourself with regular exercise. Continue to strive for a range for 150-300 minutes of exercise for weight maintenance and incorporating strength training twice a week to help preserve muscle!      FOLLOW-UP:    Please call [phone] to schedule your next visit in 3 months.    18 minutes spent on the date of the encounter doing chart review, history and exam, result review, counseling,  "developing plan of care, documentation, and further activities as noted      INTERVAL HISTORY:  Chrissie returns for medical weight management follow up.  Last seen on 11/15/2023 by myself.  Referred for eating disorder evaluation.  This was performed at reviewed and negative.  We did try for Saxenda however it needed to trial phentermine for at least 3 months so Lomaira was started.    Eating disorder screen evaluated w/Christophe negative  Try for saxenda  Try for lomaira   Saxenda denied but sounds like can retry in 3 months after on lomaira    Taking 8 mg Lomaira - no noted poor side effects (difficulty sleeping, anxiety - HR and BP are good today). Not helpful either though.    New fit bit for [place] - tracking steps, increasing hydration, working on good food choices. Working on resistance bands.    WEIGHT METRICS:  Body mass index is 33.41 kg/m .   Current Weight: 207 lb (93.9 kg)  Last Visits Weight: 206 lb (93.4 kg)  Initial Weight (lbs): 206 lbs  Cumulative weight loss (lbs): -1  Weight Loss Percentage: -0.49%    Wt Readings from Last 10 Encounters:   01/11/24 207 lb (93.9 kg)   01/11/24 207 lb (93.9 kg)   11/16/23 206 lb (93.4 kg)   11/15/23 206 lb (93.4 kg)   06/28/23 178 lb 9.6 oz (81 kg)   12/20/22 169 lb 6.4 oz (76.8 kg)   12/16/22 167 lb (75.8 kg)   12/07/22 167 lb 9.6 oz (76 kg)   11/15/22 172 lb 3.2 oz (78.1 kg)   09/14/22 172 lb 12.8 oz (78.4 kg)      LABS:  Reviewed in Epic    11/20/23 TSH WNL  6/28/23 hgA1c WNL  CMP WNL except alk phos 116  CBC basically WNL      BP Readings from Last 6 Encounters:   01/11/24 124/84   11/16/23 126/84   11/15/23 114/73   06/28/23 121/84   12/20/22 110/72   12/16/22 115/77       Pulse Readings from Last 6 Encounters:   01/11/24 84   11/16/23 83   11/15/23 86   06/28/23 82   12/20/22 101   12/16/22 82       PE:  /84   Pulse 84   Ht 5' 6\" (1.676 m)   Wt 207 lb (93.9 kg)   SpO2 97%   BMI 33.41 kg/m    GENERAL: Healthy, alert and no distress  EYES: Eyes grossly " normal to inspection.    RESP: No audible wheeze, cough, or visible cyanosis.  No increased work of breathing. Lungs clear to auscultation  Heart: regular rate and rhythm. No significant murmurs, rubs, or gallops  SKIN: Visible skin clear.   NEURO: Mentation and speech appropriate for age.  PSYCH: Mentation appears normal, affect normal/bright, judgement and insight intact, normal speech and appearance well-groomed.      Sincerely,      Holly Arenas PA-C

## 2024-01-11 ENCOUNTER — OFFICE VISIT (OUTPATIENT)
Dept: SURGERY | Facility: CLINIC | Age: 64
End: 2024-01-11
Payer: COMMERCIAL

## 2024-01-11 ENCOUNTER — ALLIED HEALTH/NURSE VISIT (OUTPATIENT)
Dept: SURGERY | Facility: CLINIC | Age: 64
End: 2024-01-11
Payer: COMMERCIAL

## 2024-01-11 VITALS
HEIGHT: 66 IN | WEIGHT: 207 LBS | DIASTOLIC BLOOD PRESSURE: 84 MMHG | OXYGEN SATURATION: 97 % | HEART RATE: 84 BPM | BODY MASS INDEX: 33.27 KG/M2 | SYSTOLIC BLOOD PRESSURE: 124 MMHG

## 2024-01-11 VITALS — HEIGHT: 66 IN | BODY MASS INDEX: 33.27 KG/M2 | WEIGHT: 207 LBS

## 2024-01-11 DIAGNOSIS — E66.09 CLASS 1 OBESITY DUE TO EXCESS CALORIES WITH SERIOUS COMORBIDITY AND BODY MASS INDEX (BMI) OF 33.0 TO 33.9 IN ADULT: Primary | ICD-10-CM

## 2024-01-11 DIAGNOSIS — E66.811 CLASS 1 OBESITY DUE TO EXCESS CALORIES WITH SERIOUS COMORBIDITY AND BODY MASS INDEX (BMI) OF 33.0 TO 33.9 IN ADULT: Primary | ICD-10-CM

## 2024-01-11 PROCEDURE — 99213 OFFICE O/P EST LOW 20 MIN: CPT | Performed by: PHYSICIAN ASSISTANT

## 2024-01-11 PROCEDURE — 97802 MEDICAL NUTRITION INDIV IN: CPT

## 2024-01-11 RX ORDER — PHENTERMINE HYDROCHLORIDE 15 MG/1
15 CAPSULE ORAL EVERY MORNING
Qty: 30 CAPSULE | Refills: 2 | Status: SHIPPED | OUTPATIENT
Start: 2024-01-11 | End: 2024-04-17

## 2024-01-11 NOTE — PATIENT INSTRUCTIONS
"To ensure quality you may receive a patient satisfaction survey. The greatest compliment you can give is \"Likely to Recommend.\"    Nice to talk with you today.  Thank you for your trust. Below is the plan discussed.-  Holly Arenas PA-C     Plan:  Increase phentermine to 15 mg once daily in the mornings.  We will touch base by phone around Feb 20 to assess progress and if needing to switch and retry for Saxenda/Wegovy (or another injectable).      Goals:  Focus on healthy food choices - prioritizing protein and veggies in your meals. I recommend eating every 4-6 hours to reduce the risk of low blood sugars and keep your metabolism up during the day.  Great job with exercising - please continue to invest in yourself with regular exercise. Continue to strive for a range for 150-300 minutes of exercise for weight maintenance and incorporating strength training twice a week to help preserve muscle!      FOLLOW-UP:    Please call [phone] to schedule your next visit in 3 months.    WEGOVY (semaglutide)      Wegovy (semaglutide) injection 2.4 mg is an injectable prescription medicine used for adults with obesity (BMI ?30) or overweight (excess weight) (BMI ?27) who also have weight-related medical problems to help them lose weight and keep the weight off.  It is a GLP-1 agonist medication. GLP1 agonists stimulate the hormone GLP-1 in your body o allow you to feel full quickly and stay full longer.    Due to the shortage, You may need to be persistent and patient to get these initial dosages due to the shortage.  Once you are able to obtain the 0.25 and 0.5 mg and 1 mg dose \"12 weeks of therapy\" you can begin treatment.     Directions:  Start Wegovy (semaglutide) 0.25 mg once weekly for 4 weeks, then if tolerating increase to 0.5 mg weekly for 4 weeks, then if tolerating increase to 1 mg weekly for 4 weeks, then if tolerating increase to 1.7 mg weekly for 4 weeks, then if tolerating increase to 2.4 mg weekly " thereafter.      -Each Wegovy pen is a once weekly single-dose prefilled pen with a pen injector already built within the pen. Discard the Wegovy pen after use in sharps container.     Common Side Effects:    nausea, headache, diarrhea, stomach upset.  If these become unmanageable call or mychart.    Serious Side effects:   Pancreatitis (inflammation of the pancreas) has been associated with this type of medication, but is very rare.Symptoms of pancreatitis include: Pain in your upper stomach area which may travel to your back and be worse after eating.     Storage:   Store the prescription in the refrigerator. Once it is time to use the Wegovy pen, you can keep the pen at room temperature and it is good for up to 28 days at room temperature.     How to inject:  For a video on how to use the pen please go to:  https://www.[employer]/about-wegovy/how-to-use-the-wegovy-pen.html#itemTwo       For any questions or concerns please send a [employer] message to our team or call our weight management call center at [phone] during regular business hours. For questions during evenings or weekends your messages will be addressed during the next business day.  For emergencies please call 911 or seek immediate medical care.

## 2024-01-11 NOTE — PATIENT INSTRUCTIONS
"Sp Dickens!        It was great meeting with you today! Here are some links to the handouts I referenced:        Protein Sources:  http://[email or web address].pdf     Fiber Sources:  http://[employer]/065251.pdf     My Plate:  https://www.choosemyplate.gov/        A helpful search term to type into [employer], [employer], etc is \"myplate examples.\" [myplate examples - Google Search]     Key points from today:  Eat 3 meals/day at consistent intervals  Shoot for 60-90 protein (20-30g/meal)  Aim for 25-35g fiber (5-10g/meal)  Eat slowly: 20-30 minutes per meal     Here is a summary of the goals that we discussed:     1. At meals:  - Include at least 20g protein  - Include at least 3 food groups      2. Eat balanced meals at regular intervals  - Have your first meal within 1 hour of waking up, then every 4-6 hours          Let's plan on following up in 1-2 months. This can be scheduled via our call center at . Of course, reach out sooner if you have any questions or concerns. Have a great week and welcome to the program!        Fiona Bolivar, CORINA, LD?  Clinical Dietitian   "

## 2024-01-11 NOTE — ASSESSMENT & PLAN NOTE
Patient was congratulated on wt loss success thus far. Healthy habits to assist with further weight loss were discussed. Chrissie will continue phentermine and increase to 15 mg. Plan to try for GLP/GIP in one month pending phentermine response

## 2024-01-11 NOTE — PROGRESS NOTES
"MEDICAL WEIGHT LOSS INITIAL EVALUATION  DIAGNOSIS:  Obesity Class I    NUTRITION HISTORY:  Diet and exercise history per provider visit on 11/15/2023 as follows:    Diet Recall      Wake Up: 3 am    Breakfast 4 am - egg wrap 1/4 c turkey sausage   Lunch Home: Grazing during the day - package of almond/cashew/cranberries x2, nutterbutter sandwhich,  If at office: light and fit yogurt, 1/4 c cl granola, powercrunch bars, slim mini beef jerky stick and a pack of string cheese.   Supper 4:30pm 1/2 of a petit steak and baked potato and salad    Bedtime:   Snack between meals:   Snack in evenin:30-6pm and goes to bed to avoid eating. Used to stay up till 7pm but would eat during that time frame so went to bed instead              Typical snacks: See above   Caloric beverages per day. What type:     Water consumed daily: Plain or carbonated - 2 cans of water and one-two bottle    Low elva/diet drinks:   Alcohol:   Foods you crave:    Mini coke zero for caffeine    Not regularly - occasional Friday drink or special occasion.                Who does the grocery shopping? Both self and    Who does the cooking? Self        Activity/Exercise History           How many times a week are you active for the purpose of exercise? For how long? Not much - could walk, can do yard raking. Gets about 10k steps in as well.    What do you do for exercise?      What keeps you from being more active?       Additional Information: Pt cleared by Christophe to start program/negative ED assessment. Pt with long history of yo-yo dieting. Tends to focus on protein + vegetables. Craves candy throughout the day.     ANTHROPOMETRICS:  Height: 5' 6\"   Weight: 207 lbs 0 oz  BMI:  33.41 kg/m2  NUTRITION DIAGNOSIS:   Obese class I related to overeating and poor lifestyle habits as evidence by patient's subjective statements and  BMI of 33.41 kg/m2   NUTRITION INTERVENTIONS  Nutrition Prescription:  Recommend modified energy- nutrient " intake  Implementation:  Nutrition Education (Content):  Discussed portion sizes and plate method  Provided: Tips for Weight Loss and Weight Management, Protein Sources for Weight Loss, Fiber Content in Food, Plate Method    Nutrition Education (Application):   Patient to practice goals as stated below  Patient verbalizes understanding of diet by stating she will include 3 food groups at each meal.  Anticipate good compliance    Goals:  Eat at least 3 food groups at each meal  Aim for 20g protein at each meal  Eat your first meal within 1h of waking up, then every 4-6 hours      FOLLOW UP AND MONITORING:    Other  - follow up in 4-8 weeks.     TIME SPENT WITH PATIENT:   35 minutes        Fiona Bolivar RD, LD  Clinical Dietitian

## 2024-02-07 DIAGNOSIS — E78.2 MIXED HYPERLIPIDEMIA: ICD-10-CM

## 2024-02-07 RX ORDER — ROSUVASTATIN CALCIUM 10 MG/1
10 TABLET, COATED ORAL AT BEDTIME
Qty: 90 TABLET | Refills: 2 | Status: SHIPPED | OUTPATIENT
Start: 2024-02-07 | End: 2024-09-03

## 2024-02-07 NOTE — TELEPHONE ENCOUNTER
Patient called as she is needing rosuvastatin refilled and she used to see Dr. Graham     Scheduled physical on 9/3 as summer is blocked off patient is due in June and does not want to see male provider    She is wondering if [facility] can fill rosuvastatin until appointment

## 2024-02-21 ENCOUNTER — TELEPHONE (OUTPATIENT)
Dept: SURGERY | Facility: CLINIC | Age: 64
End: 2024-02-21

## 2024-02-21 DIAGNOSIS — E66.811 CLASS 1 OBESITY DUE TO EXCESS CALORIES WITH SERIOUS COMORBIDITY AND BODY MASS INDEX (BMI) OF 33.0 TO 33.9 IN ADULT: Primary | ICD-10-CM

## 2024-02-21 DIAGNOSIS — E66.09 CLASS 1 OBESITY DUE TO EXCESS CALORIES WITH SERIOUS COMORBIDITY AND BODY MASS INDEX (BMI) OF 33.0 TO 33.9 IN ADULT: Primary | ICD-10-CM

## 2024-02-21 RX ORDER — SEMAGLUTIDE 0.5 MG/.5ML
0.5 INJECTION, SOLUTION SUBCUTANEOUS WEEKLY
Qty: 2 ML | Refills: 1 | Status: SHIPPED | OUTPATIENT
Start: 2024-02-21 | End: 2024-08-21

## 2024-02-21 RX ORDER — SEMAGLUTIDE 0.25 MG/.5ML
0.25 INJECTION, SOLUTION SUBCUTANEOUS WEEKLY
Qty: 2 ML | Refills: 0 | Status: SHIPPED | OUTPATIENT
Start: 2024-02-21 | End: 2024-04-17

## 2024-02-21 RX ORDER — SEMAGLUTIDE 1 MG/.5ML
1 INJECTION, SOLUTION SUBCUTANEOUS WEEKLY
Qty: 2 ML | Refills: 1 | Status: SHIPPED | OUTPATIENT
Start: 2024-02-21 | End: 2024-08-21

## 2024-02-21 NOTE — TELEPHONE ENCOUNTER
Per chart review: EPA started  Reply deadline: February 28, 2024 10:16 AM   There is a chance that it will be auto-denied and an appeal will be necessary to explain that she has tried phentermine.    Will notify provider.    Eva PETERS RN

## 2024-02-21 NOTE — TELEPHONE ENCOUNTER
Called patient per Holly's request to inquire how Lomaira is going after 1 month of use.  Patient denies any side effects, but has not lost any weight or felt an effect from the medication.  She would like Holly WHALEY to know that she is open to trying an injectable at this time.    Will route response to provider.    Eva PETERS RN

## 2024-02-26 ENCOUNTER — TELEPHONE (OUTPATIENT)
Dept: SURGERY | Facility: CLINIC | Age: 64
End: 2024-02-26
Payer: COMMERCIAL

## 2024-02-26 NOTE — TELEPHONE ENCOUNTER
Called and discussed Wegovy, pt still has handout from prior and was able to watch the instruction video for how to be doing the injections. No questions there. Reviewed starting titration - no additional questions.    Holly Arenas PA-C

## 2024-03-19 ENCOUNTER — TELEPHONE (OUTPATIENT)
Dept: SURGERY | Facility: CLINIC | Age: 64
End: 2024-03-19
Payer: COMMERCIAL

## 2024-03-19 NOTE — TELEPHONE ENCOUNTER
Pt us working with wt loss clinic.  Was prescribed Wegovy and has finally been able to get the 0.25 and 1.0 mg doses.  Unable to get the 0.5 mg dose.    Is wondering if can start the 0.25 mg and then jump to the 1.0 mg dose.    Recommended to stay the course and wait for the 0.5 mg dose to avoid unwanted SE.  Patient verbalized understanding and is agreeable to plan.  Suzy Kay, MS, RD, RN

## 2024-04-08 ENCOUNTER — APPOINTMENT (OUTPATIENT)
Dept: URBAN - METROPOLITAN AREA CLINIC 255 | Age: 64
Setting detail: DERMATOLOGY
End: 2024-04-09

## 2024-04-08 DIAGNOSIS — L71.8 OTHER ROSACEA: ICD-10-CM

## 2024-04-08 PROCEDURE — OTHER ADDITIONAL NOTES: OTHER

## 2024-04-08 PROCEDURE — OTHER COUNSELING: OTHER

## 2024-04-08 PROCEDURE — OTHER PRESCRIPTION MEDICATION MANAGEMENT: OTHER

## 2024-04-08 PROCEDURE — OTHER MIPS QUALITY: OTHER

## 2024-04-08 PROCEDURE — 99213 OFFICE O/P EST LOW 20 MIN: CPT

## 2024-04-08 ASSESSMENT — LOCATION SIMPLE DESCRIPTION DERM: LOCATION SIMPLE: NOSE

## 2024-04-08 ASSESSMENT — LOCATION ZONE DERM: LOCATION ZONE: NOSE

## 2024-04-08 ASSESSMENT — LOCATION DETAILED DESCRIPTION DERM: LOCATION DETAILED: NASAL TIP

## 2024-04-08 NOTE — PROCEDURE: ADDITIONAL NOTES
Render Risk Assessment In Note?: no
Additional Notes: Today on exam- Affected area is slightly red from underlying blood vessels but no skin changes noted. No evidence of malignancy or AK. Favor rosacea etiology but I do recommend taking photos during a flare or RTC when symptoms are present for best ability to assess and provide diagnosis.  Pt agrees to RTC when symptoms are present.
Detail Level: Simple

## 2024-04-08 NOTE — PROCEDURE: PRESCRIPTION MEDICATION MANAGEMENT
Plan: Patient reports good improvement with metrocream 0.75% BID use PRN flare. Patient cant continue with this prescription unless she will be coming into office for appt. same day of flaring so that I can assess in an untreated state.
Detail Level: Zone
Render In Strict Bullet Format?: No

## 2024-04-15 NOTE — PROGRESS NOTES
2024      Return Medical Weight Management Note     Dominique Craft  MRN:  [id number]  :  1960    Assessment & Plan   Problem List Items Addressed This Visit       Esophageal reflux    Relevant Medications    Semaglutide-Weight Management (WEGOVY) 1.7 MG/0.75ML pen    Elevated lipids    Relevant Medications    Semaglutide-Weight Management (WEGOVY) 1.7 MG/0.75ML pen    Class 1 obesity due to excess calories with serious comorbidity and body mass index (BMI) of 34.0 to 34.9 in adult - Primary     Healthy habits to assist with further weight loss were discussed. Chrissie will continue Wegovy titration - repeat labs ordered for in 2 months.           Relevant Medications    Semaglutide-Weight Management (WEGOVY) 1.7 MG/0.75ML pen        AOM Considerations:  Phentermine:  lomaira started 23   Topiramate: hx of stones, Current birth control: hyesterectomy          GLP-1: wegovy, saxenda appear covered - she is familiar w/wegovy and researched it. Discussed        Naltrexone:        Wellbutrin:         Metformin: used previously for preDM             Contrave: appear covered  Qsymia: avoid d/t kidney stones      PATIENT INSTRUCTIONS:  Pt Instructions:  Continue the Wegovy titration. Increase to 0.5 mg. After one month if doing well - increase to 1.0 mg. If doing well there after one month, increase to 1.7 mg.   Labs ordered.  Please call [phone] to set up a lab appt. To repeat BMP (kidney check) in about 2 months.   Can touch base w/primary care if any concerns for cardiovascular disease for anticipation of insurance changes w/USP w/your self and .     Goals:  Continue Focus on healthy food choices - prioritizing protein and veggies in your meals. I recommend eating every 4-6 hours to reduce the risk of low blood sugars and try to minimize snacking between meals. Stay well hydrated though the day as well.  Great goals for the summer and exercising.   Great job with exercising - please  continue to invest in yourself with regular exercise. Continue to strive for a range for 150-300 minutes of exercise for weight maintenance and incorporating strength training twice a week to help preserve muscle!      Follow up:    Call [phone] to schedule next visit in 3-4 months     22 minutes spent on the date of the encounter doing chart review, history and exam, result review, counseling, developing plan of care, documentation, and further activities as noted      INTERVAL HISTORY:  Chrissie returns for medical weight management follow up.  Last seen on 1/11/2024 by myself.  Plan at that time was to continue phentermine for another month to assess efficacy and then consider retrying for a GLP/GIP    We then did later connect for updates and plan was to try for Wegovy given lack of efficacy with Lomaira/phentermine.    Just finished the 0.25 mg dose - no side effects.    Eating patterns - improving. Noting working on healthy choices and eating less when able    Every weekend doing dance compititions w/granddaughter. That season ends in May. Right now working long Saturdays w/dance. Retiring in June coming up as well. Committed to early morning walk with daughter's dog.  Making goals/plans. Feeling good with them. Definitely feeling better mentally. Also using resistance bands.     Didn't find dietitian overly helpful - felt recommendations were achievable/helpful. May consider again going forward.    WEIGHT METRICS:  Body mass index is 34.22 kg/m .   Current Weight: 212 lb (96.2 kg)  Last Visits Weight: 207 lb (93.9 kg)  Initial Weight (lbs): 206 lbs  Cumulative weight loss (lbs): -6  Weight Loss Percentage: -2.91%    Wt Readings from Last 10 Encounters:   04/17/24 212 lb (96.2 kg)   01/11/24 207 lb (93.9 kg)   01/11/24 207 lb (93.9 kg)   11/16/23 206 lb (93.4 kg)   11/15/23 206 lb (93.4 kg)   06/28/23 178 lb 9.6 oz (81 kg)   12/20/22 169 lb 6.4 oz (76.8 kg)   12/16/22 167 lb (75.8 kg)   12/07/22 167 lb 9.6  "oz (76 kg)   11/15/22 172 lb 3.2 oz (78.1 kg)             LABS:  Reviewed in Epic    6/28/23 CMP alk phos 116 ow WNL  hgA1c WNL    BP Readings from Last 6 Encounters:   04/17/24 124/78   01/11/24 124/84   11/16/23 126/84   11/15/23 114/73   06/28/23 121/84   12/20/22 110/72       Pulse Readings from Last 6 Encounters:   04/17/24 90   01/11/24 84   11/16/23 83   11/15/23 86   06/28/23 82   12/20/22 101       PE:  /78   Pulse 90   Ht 5' 6\" (1.676 m)   Wt 212 lb (96.2 kg)   SpO2 98%   BMI 34.22 kg/m    GENERAL: Healthy, alert and no distress  EYES: Eyes grossly normal to inspection.    RESP: No audible wheeze, cough, or visible cyanosis.  No increased work of breathing.    SKIN: Visible skin clear. No significant rash, abnormal pigmentation or lesions.  NEURO: Mentation and speech appropriate for age.  PSYCH: Mentation appears normal, affect normal/bright, judgement and insight intact, normal speech and appearance well-groomed.      Sincerely,      Holly Arenas PA-C       "

## 2024-04-17 ENCOUNTER — OFFICE VISIT (OUTPATIENT)
Dept: SURGERY | Facility: CLINIC | Age: 64
End: 2024-04-17
Payer: COMMERCIAL

## 2024-04-17 VITALS
OXYGEN SATURATION: 98 % | HEIGHT: 66 IN | HEART RATE: 90 BPM | SYSTOLIC BLOOD PRESSURE: 124 MMHG | DIASTOLIC BLOOD PRESSURE: 78 MMHG | BODY MASS INDEX: 34.07 KG/M2 | WEIGHT: 212 LBS

## 2024-04-17 DIAGNOSIS — K21.9 GASTROESOPHAGEAL REFLUX DISEASE WITHOUT ESOPHAGITIS: ICD-10-CM

## 2024-04-17 DIAGNOSIS — E78.5 ELEVATED LIPIDS: ICD-10-CM

## 2024-04-17 DIAGNOSIS — E66.09 CLASS 1 OBESITY DUE TO EXCESS CALORIES WITH SERIOUS COMORBIDITY AND BODY MASS INDEX (BMI) OF 33.0 TO 33.9 IN ADULT: Primary | ICD-10-CM

## 2024-04-17 DIAGNOSIS — E66.09 CLASS 1 OBESITY DUE TO EXCESS CALORIES WITH SERIOUS COMORBIDITY AND BODY MASS INDEX (BMI) OF 34.0 TO 34.9 IN ADULT: ICD-10-CM

## 2024-04-17 DIAGNOSIS — E66.811 CLASS 1 OBESITY DUE TO EXCESS CALORIES WITH SERIOUS COMORBIDITY AND BODY MASS INDEX (BMI) OF 33.0 TO 33.9 IN ADULT: Primary | ICD-10-CM

## 2024-04-17 DIAGNOSIS — E66.811 CLASS 1 OBESITY DUE TO EXCESS CALORIES WITH SERIOUS COMORBIDITY AND BODY MASS INDEX (BMI) OF 34.0 TO 34.9 IN ADULT: ICD-10-CM

## 2024-04-17 PROCEDURE — 99213 OFFICE O/P EST LOW 20 MIN: CPT | Performed by: PHYSICIAN ASSISTANT

## 2024-04-17 NOTE — PATIENT INSTRUCTIONS
Nice to talk with you today. Below is the plan discussed.-  Holly Arenas PA-C     Pt Instructions:  Continue the Wegovy titration. Increase to 0.5 mg. After one month if doing well - increase to 1.0 mg. If doing well there after one month, increase to 1.7 mg.   Labs ordered.  Please call [phone] to set up a lab appt. To repeat BMP (kidney check) in about 2 months.   Can touch base w/primary care if any concerns for cardiovascular disease for anticipation of insurance changes w/[facility] w/your self and .     Goals:  Continue Focus on healthy food choices - prioritizing protein and veggies in your meals. I recommend eating every 4-6 hours to reduce the risk of low blood sugars and try to minimize snacking between meals. Stay well hydrated though the day as well.  Great goals for the summer and exercising.   Great job with exercising - please continue to invest in yourself with regular exercise. Continue to strive for a range for 150-300 minutes of exercise for weight maintenance and incorporating strength training twice a week to help preserve muscle!      Follow up:    Call [phone] to schedule next visit in 3-4 months

## 2024-04-17 NOTE — ASSESSMENT & PLAN NOTE
Healthy habits to assist with further weight loss were discussed. Chrissie will continue Wegovy titration - repeat labs ordered for in 2 months.

## 2024-05-15 RX ORDER — METRONIDAZOLE 7.5 MG/G
CREAM TOPICAL
Qty: 45 | Refills: 5 | Status: ERX

## 2024-07-16 DIAGNOSIS — E66.09 CLASS 1 OBESITY DUE TO EXCESS CALORIES WITH SERIOUS COMORBIDITY AND BODY MASS INDEX (BMI) OF 33.0 TO 33.9 IN ADULT: ICD-10-CM

## 2024-07-16 DIAGNOSIS — E66.811 CLASS 1 OBESITY DUE TO EXCESS CALORIES WITH SERIOUS COMORBIDITY AND BODY MASS INDEX (BMI) OF 33.0 TO 33.9 IN ADULT: ICD-10-CM

## 2024-07-16 DIAGNOSIS — K21.9 GASTROESOPHAGEAL REFLUX DISEASE WITHOUT ESOPHAGITIS: ICD-10-CM

## 2024-07-16 DIAGNOSIS — E78.5 ELEVATED LIPIDS: ICD-10-CM

## 2024-07-16 RX ORDER — SEMAGLUTIDE 1.7 MG/.75ML
1.7 INJECTION, SOLUTION SUBCUTANEOUS
Qty: 3 ML | Refills: 0 | Status: SHIPPED | OUTPATIENT
Start: 2024-07-16

## 2024-08-12 ENCOUNTER — LAB (OUTPATIENT)
Dept: LAB | Facility: CLINIC | Age: 64
End: 2024-08-12
Payer: COMMERCIAL

## 2024-08-12 DIAGNOSIS — E66.09 CLASS 1 OBESITY DUE TO EXCESS CALORIES WITH SERIOUS COMORBIDITY AND BODY MASS INDEX (BMI) OF 33.0 TO 33.9 IN ADULT: ICD-10-CM

## 2024-08-12 DIAGNOSIS — E66.811 CLASS 1 OBESITY DUE TO EXCESS CALORIES WITH SERIOUS COMORBIDITY AND BODY MASS INDEX (BMI) OF 33.0 TO 33.9 IN ADULT: ICD-10-CM

## 2024-08-12 LAB
ANION GAP SERPL CALCULATED.3IONS-SCNC: 8 MMOL/L (ref 7–15)
BUN SERPL-MCNC: 14.8 MG/DL (ref 8–23)
CALCIUM SERPL-MCNC: 9.5 MG/DL (ref 8.8–10.4)
CHLORIDE SERPL-SCNC: 104 MMOL/L (ref 98–107)
CREAT SERPL-MCNC: 0.97 MG/DL (ref 0.51–0.95)
EGFRCR SERPLBLD CKD-EPI 2021: 66 ML/MIN/1.73M2
GLUCOSE SERPL-MCNC: 84 MG/DL (ref 70–99)
HCO3 SERPL-SCNC: 29 MMOL/L (ref 22–29)
POTASSIUM SERPL-SCNC: 4.5 MMOL/L (ref 3.4–5.3)
SODIUM SERPL-SCNC: 141 MMOL/L (ref 135–145)

## 2024-08-12 PROCEDURE — 36415 COLL VENOUS BLD VENIPUNCTURE: CPT

## 2024-08-12 PROCEDURE — 80048 BASIC METABOLIC PNL TOTAL CA: CPT

## 2024-08-14 ENCOUNTER — TELEPHONE (OUTPATIENT)
Dept: SURGERY | Facility: CLINIC | Age: 64
End: 2024-08-14
Payer: COMMERCIAL

## 2024-08-14 NOTE — TELEPHONE ENCOUNTER
Lab results from [facility] 8/12/24 printed and mailed to patient's home address.    Next appt 8/21/24.    Eva PETERS RN

## 2024-08-19 NOTE — PROGRESS NOTES
2024      Return Medical Weight Management Note     Dominique Craft  MRN:  [id number]  :  1960    Assessment & Plan   Problem List Items Addressed This Visit       Esophageal reflux    Relevant Medications    tirzepatide-Weight Management (ZEPBOUND) 7.5 MG/0.5ML prefilled pen    tirzepatide-Weight Management (ZEPBOUND) 10 MG/0.5ML prefilled pen    Elevated lipids    Relevant Medications    tirzepatide-Weight Management (ZEPBOUND) 7.5 MG/0.5ML prefilled pen    tirzepatide-Weight Management (ZEPBOUND) 10 MG/0.5ML prefilled pen    Class 1 obesity due to excess calories with serious comorbidity and body mass index (BMI) of 32.0 to 32.9 in adult - Primary     Patient was congratulated on wt loss success thus far. Healthy habits to assist with further weight loss were discussed. Chrissie will look to switch from Wegovy to Zepbound for greater efficacy. Repeat BMP ordered for monitoring.           Relevant Medications    tirzepatide-Weight Management (ZEPBOUND) 7.5 MG/0.5ML prefilled pen    tirzepatide-Weight Management (ZEPBOUND) 10 MG/0.5ML prefilled pen    Other Relevant Orders    Basic metabolic panel        AOM Considerations:  Phentermine:  lomaira started 23   Topiramate: hx of stones, Current birth control: hyesterectomy          GLP-1: wegovy, saxenda appear covered - she is familiar w/wegovy and researched it. Discussed        Naltrexone:        Wellbutrin:         Metformin: used previously for preDM             Contrave: appear covered  Qsymia: avoid d/t kidney stones      PATIENT INSTRUCTIONS:  Pt Instructions:  Let's switch from 1.7 mg Wegovy to 7.5 mg Zepbound for greater efficacy. If tolerating 7.5 mg dose well for at least 1 month - you could increase to 10 mg.   Labs ordered.  Please call [phone] to set up a lab appt.  Let's recheck this in 2 months with good hydration. Not needed to be fasting. Fine to check with September as well.     Goals:  Continue to Focus on healthy food  choices - prioritizing protein and veggies in your meals. I recommend eating every 4-6 hours to reduce the risk of low blood sugars and try to minimize snacking between meals. Stay well hydrated though the day as well.  Great job with exercising - please continue to invest in yourself with regular exercise. Continue to strive for a range for 150-300 minutes of exercise for weight maintenance and incorporating strength training twice a week to help preserve muscle!      Follow up:    Call [phone] to schedule next visit in next available. Be in touch on Mychart/call for refills/concerns between visits    26 minutes spent on the date of the encounter doing chart review, history and exam, result review, counseling, developing plan of care, documentation, and further activities as noted      INTERVAL HISTORY:  Chrissie returns for medical weight management follow up.  Last seen on 4/17/24 by myself with plan to continue Wegovy titration.    8/12/2024 BMP creatinine 0.97 otherwise normal - was doing more caffeine but cutting out and focusing on good hydration    Now retired - picking up granddaughter to volleyball. Enjoying and feeling good. No bothersome side effects. Does feel like things are happening. Open to greater efficacy.    Still under insurance under husbands plan.    Walking dog in mornings - looking to add an evening walk while weather is reasonable.    Likely more in September. Not trying for too many goals not wanting to then pivot and getting frustrated. Getting a bunch over to [employer].     WEIGHT METRICS:  Body mass index is 32.44 kg/m .   Current Weight: 201 lb (91.2 kg)  Last Visits Weight: 212 lb (96.2 kg)  Initial Weight (lbs): 212 lbs  Cumulative weight loss (lbs): 11  Weight Loss Percentage: 5.19%    Wt Readings from Last 10 Encounters:   08/21/24 201 lb (91.2 kg)   04/17/24 212 lb (96.2 kg)   01/11/24 207 lb (93.9 kg)   01/11/24 207 lb (93.9 kg)   11/16/23 206 lb (93.4 kg)   11/15/23 206 lb (93.4 kg)  "  06/28/23 178 lb 9.6 oz (81 kg)   12/20/22 169 lb 6.4 oz (76.8 kg)   12/16/22 167 lb (75.8 kg)   12/07/22 167 lb 9.6 oz (76 kg)                 LABS:  Reviewed in Epic    8/12/2024 BMP creatinine 0.97 otherwise normal    BP Readings from Last 6 Encounters:   08/21/24 99/60   04/17/24 124/78   01/11/24 124/84   11/16/23 126/84   11/15/23 114/73   06/28/23 121/84       Pulse Readings from Last 6 Encounters:   08/21/24 86   04/17/24 90   01/11/24 84   11/16/23 83   11/15/23 86   06/28/23 82       PE:  BP 99/60   Pulse 86   Ht 5' 6\" (1.676 m)   Wt 201 lb (91.2 kg)   SpO2 97%   BMI 32.44 kg/m    GENERAL: Healthy, alert and no distress  EYES: Eyes grossly normal to inspection.    RESP: No audible wheeze, cough, or visible cyanosis.  No increased work of breathing.    SKIN: Visible skin clear. No significant rash, abnormal pigmentation or lesions.  NEURO: Mentation and speech appropriate for age.  PSYCH: Mentation appears normal, affect normal/bright, judgement and insight intact, normal speech and appearance well-groomed.      Sincerely,      Holly Arenas PA-C       "

## 2024-08-21 ENCOUNTER — OFFICE VISIT (OUTPATIENT)
Dept: SURGERY | Facility: CLINIC | Age: 64
End: 2024-08-21
Payer: COMMERCIAL

## 2024-08-21 VITALS
SYSTOLIC BLOOD PRESSURE: 99 MMHG | HEART RATE: 86 BPM | DIASTOLIC BLOOD PRESSURE: 60 MMHG | WEIGHT: 201 LBS | BODY MASS INDEX: 32.3 KG/M2 | HEIGHT: 66 IN | OXYGEN SATURATION: 97 %

## 2024-08-21 DIAGNOSIS — E66.811 CLASS 1 OBESITY DUE TO EXCESS CALORIES WITH SERIOUS COMORBIDITY AND BODY MASS INDEX (BMI) OF 32.0 TO 32.9 IN ADULT: Primary | ICD-10-CM

## 2024-08-21 DIAGNOSIS — K21.9 GASTROESOPHAGEAL REFLUX DISEASE WITHOUT ESOPHAGITIS: ICD-10-CM

## 2024-08-21 DIAGNOSIS — E78.5 ELEVATED LIPIDS: ICD-10-CM

## 2024-08-21 DIAGNOSIS — E66.09 CLASS 1 OBESITY DUE TO EXCESS CALORIES WITH SERIOUS COMORBIDITY AND BODY MASS INDEX (BMI) OF 32.0 TO 32.9 IN ADULT: Primary | ICD-10-CM

## 2024-08-21 PROCEDURE — 99213 OFFICE O/P EST LOW 20 MIN: CPT | Performed by: PHYSICIAN ASSISTANT

## 2024-08-21 RX ORDER — SEMAGLUTIDE 2.4 MG/.75ML
2.4 INJECTION, SOLUTION SUBCUTANEOUS WEEKLY
Qty: 9 ML | Refills: 1 | Status: SHIPPED | OUTPATIENT
Start: 2024-08-21 | End: 2024-08-21

## 2024-08-21 NOTE — ASSESSMENT & PLAN NOTE
Patient was congratulated on wt loss success thus far. Healthy habits to assist with further weight loss were discussed. Chrissie will look to switch from Wegovy to Zepbound for greater efficacy. Repeat BMP ordered for monitoring.

## 2024-08-21 NOTE — PATIENT INSTRUCTIONS
Nice to talk with you today. Below is the plan discussed.-  Holly Arenas PA-C     Pt Instructions:  Let's switch from 1.7 mg Wegovy to 7.5 mg Zepbound for greater efficacy. If tolerating 7.5 mg dose well for at least 1 month - you could increase to 10 mg.   Labs ordered.  Please call [phone] to set up a lab appt.  Let's recheck this in 2 months with good hydration. Not needed to be fasting. Fine to check with September as well.     Goals:  Continue to Focus on healthy food choices - prioritizing protein and veggies in your meals. I recommend eating every 4-6 hours to reduce the risk of low blood sugars and try to minimize snacking between meals. Stay well hydrated though the day as well.  Great job with exercising - please continue to invest in yourself with regular exercise. Continue to strive for a range for 150-300 minutes of exercise for weight maintenance and incorporating strength training twice a week to help preserve muscle!      Follow up:    Call [phone] to schedule next visit in next available. Be in touch on Mychart/call for refills/concerns between visits      Zepbound (Tirzepatide)    Zepbound (Tirzepatide): is an injectable prescription medicine recently FDA approved for adults with obesity or overweight with an additional condition affected by their weight.      It is given as a shot once a week. It activates the body's receptors for GIP (glucose-dependent insulinotropic polypeptide) and GLP-1 (glucagon-like peptide-1) receptor, two naturally occurring hormones that help tell the brain that you are full. It also works is by slowing down how quickly food leaves your stomach.     You will start to feel [place] more quickly, notice portion changes and eat less often between meals.  Patients are advised to eat slowly and purposefully. Give yourself less portions. You may find after starting this medication you have a new point of fullness. Maintain consistent eating schedule with meals +/-  snacks and get in good hydration.    Dosing:  Initial dose: 2.5 mg injected subcutaneously once weekly for 4 weeks  Further dose changes can include: increase to 5 mg once weekly for 4 weeks, then 7.5 mg once weekly for 4 weeks, then 10 mg once weekly for 4 weeks then 12.5 mg once weekly for 4 weeks, then 15 mg (maximum dose) once weekly.    Dose changes are based on how you are tolerating the current dose, what benefits you are seeing at the current dose and if improvement in effectiveness of the drug is needed. The goal is to find the dose that works best for you with the least amount of side effects. You may find you reach this at a lower dose than the maximum dose.     Side effects: Common side effects include nausea, Heartburn,diarrhea, constipation. This is worse when starting the medication and with dose dose escalation.  It does improve with time. Stay adequately hydrated and eat small portions to decrease the risk of side effects.     The risk of pancreatitis (inflammation of the pancreas) has been associated with this type of medication, but is very rare.  If you have had pancreatitis in the past, this medication may not be for you. If you experience persistent severe abdominal pain (sometimes radiating to the back potentially accompanied by vomiting and have a fever), stop the medication and contact your provider immediately for assessment. They will do a blood test to check for pancreatitis.       Caution:   Tirzepatide (Zepbound, Mounjaro) May decrease effectiveness of your oral birth control while starting and with dose adjustments.  Use backup forms of birth control if necessary.      For any questions or concerns please send a [email or web address] message to our team or call our weight management call center at [phone] during regular business hours.     There is a small chance you may have some low blood sugar after taking the medication.   The signs of low blood sugar are:  Weakness  Shaky    Hungry  Sweating  Confusion      See below for ways to treat low blood sugar without adding in lots of extra calories.      Treating Low Blood Sugar    If you have symptoms of low blood sugar (sweating, shaking, dizzy, confused) eat 15 grams of carbs and wait 15 minutes:    Glucose Tabs are best for sugars under 70 -  Dex4 or BD Glucose tablets are good, you will need to take 3-4 of these to equal 15 grams.     One small box of raisins  4 oz fruit juice box or   cup fruit juice  1 small apple  1 small banana    cup canned fruit in water    English muffin or a slice of bread with jelly   1 low fat frozen waffle with sugar-free syrup    cup cottage cheese with   cup frozen or fresh blueberries  1 cup skim or low-fat milk    cup whole grain cereal  4-6 crackers such as Triscuits      This medication is usually not covered by insurance and can be quite expensive. Sometimes a prior authorization is required, which may take up to 1-2 weeks for an insurance company to make a decision if they will cover the medication. Please be patient, you will be notified after a decision has been made.    Contact the nurse via RUN or call [phone] if you have any questions or concerns. (Do not stop taking it if you don't think it's working. For some people it works without them knowing it.)     In order to get refills of this or any medication we prescribe you must be seen in the medical weight mgmt clinic every 2-4 months.    Using Your Mounjaro/Zepbound Pen  Medicine (tirzepatide)    Storing your pens  Store your pens in the refrigerator until you are ready to use them. Don't let them freeze.  Your pen may be stored at room temperature for 21 days or fewer. Just make sure the temperature doesn't get higher than 86 or lower than 46 degrees Fahrenheit.   Protect the pens from light.  Never use any pens that have .    Check your pen before use:  The liquid in the pen window should be clear or light yellow. Bubbles are  okay to see.   Do not use the pen if you can see the window contains any specks, is cloudy, or has changed color.  Make sure you have the medication and dose your health care provider prescribed.    Getting ready to inject:   1. Wash and dry your hands well.  2. Make sure the counter you use to place your supplies on is clean.  3. Make sure your injection time will not be interrupted by children or pets.  4. Have alcohol wipes or alcohol and cotton balls available to clean the injection site.   5. Choose your injection site. It can be on your stomach, back of upper arms, or upper legs. Remember to change your injection site each time you inject. Try to be at least 1 inch away from the previous one. Stay at least 1 inch away from your belly button.       Inject your dose:  1. Each pen is prefilled with one dose of medicine.    2. Pull off the grey cap at the bottom of the pen. Throw it in the trash. Do not put the cap back on the pen.   3. Make sure your pen is in the locked position. You will find the lock at the top of the pen.   4. Clean the injection site with alcohol.   5. Place the clear part of the pen against your skin. Unlock the pen by turning it to the unlock  position at the top.   6. Press and hold the purple injection button at the top of the pen. Listen for 2 clicks. The last click tells you the injection has been completed.   7. This injection is given 1 day a week. If you need to change the day you inject, there needs to be at least 3 days or 72 hours between the two doses.  8. If you miss a dose, you may take the dose within 4 days or 96 hours of the missed dose.   9. Your injection may be best tolerated if given at night.     Disposing of your pens:  Dispose of your pens in a puncture-resistant container (hard plastic bottle) or Sharps container.  Check with the county you live in on how to dispose of the container.  Do not recycle the container with used pens.       Of note, you may not be able to   your medication right away. It may require a prior authorization from your insurance company. This may take a week or more.

## 2024-08-22 ENCOUNTER — TELEPHONE (OUTPATIENT)
Dept: SURGERY | Facility: CLINIC | Age: 64
End: 2024-08-22
Payer: COMMERCIAL

## 2024-08-22 DIAGNOSIS — K21.9 GASTROESOPHAGEAL REFLUX DISEASE WITHOUT ESOPHAGITIS: ICD-10-CM

## 2024-08-22 DIAGNOSIS — E78.5 ELEVATED LIPIDS: ICD-10-CM

## 2024-08-22 DIAGNOSIS — E66.09 CLASS 1 OBESITY DUE TO EXCESS CALORIES WITH SERIOUS COMORBIDITY AND BODY MASS INDEX (BMI) OF 32.0 TO 32.9 IN ADULT: Primary | ICD-10-CM

## 2024-08-22 DIAGNOSIS — E66.811 CLASS 1 OBESITY DUE TO EXCESS CALORIES WITH SERIOUS COMORBIDITY AND BODY MASS INDEX (BMI) OF 32.0 TO 32.9 IN ADULT: Primary | ICD-10-CM

## 2024-08-22 NOTE — TELEPHONE ENCOUNTER
Pt seen yesterday 8/21/24 and provider ordered Zepbound.  Zepbound will cost pt 463/mo.  This is cost-prohibitive.  Pt would like the Wegovy 2.4 sent to same pharmacy.  Pt doesn't need contacting per pt unless questions.  Will update provider.  Suzy Kay MS, RD, RN

## 2024-08-22 NOTE — TELEPHONE ENCOUNTER
VORB: Sounds good - can you cancel the Zepbound scripts and put in to 2.4 mg Wegovy with 5 refills since we're scheduling 5-6 months between visits?    This was done.   Pt stated no need to notify unless questions.  Suzy Kay, MS, RD, RN

## 2024-09-03 ENCOUNTER — OFFICE VISIT (OUTPATIENT)
Dept: FAMILY MEDICINE | Facility: CLINIC | Age: 64
End: 2024-09-03
Payer: COMMERCIAL

## 2024-09-03 VITALS
TEMPERATURE: 97.3 F | BODY MASS INDEX: 32.3 KG/M2 | WEIGHT: 201 LBS | RESPIRATION RATE: 16 BRPM | DIASTOLIC BLOOD PRESSURE: 82 MMHG | HEIGHT: 66 IN | OXYGEN SATURATION: 100 % | SYSTOLIC BLOOD PRESSURE: 138 MMHG | HEART RATE: 82 BPM

## 2024-09-03 DIAGNOSIS — E78.2 MIXED HYPERLIPIDEMIA: Primary | ICD-10-CM

## 2024-09-03 DIAGNOSIS — E66.811 CLASS 1 OBESITY DUE TO EXCESS CALORIES WITH SERIOUS COMORBIDITY AND BODY MASS INDEX (BMI) OF 32.0 TO 32.9 IN ADULT: ICD-10-CM

## 2024-09-03 DIAGNOSIS — Z13.21 ENCOUNTER FOR VITAMIN DEFICIENCY SCREENING: ICD-10-CM

## 2024-09-03 DIAGNOSIS — Z12.11 SCREEN FOR COLON CANCER: ICD-10-CM

## 2024-09-03 DIAGNOSIS — Z76.89 ENCOUNTER TO ESTABLISH CARE: ICD-10-CM

## 2024-09-03 DIAGNOSIS — E66.09 CLASS 1 OBESITY DUE TO EXCESS CALORIES WITH SERIOUS COMORBIDITY AND BODY MASS INDEX (BMI) OF 32.0 TO 32.9 IN ADULT: ICD-10-CM

## 2024-09-03 LAB
ANION GAP SERPL CALCULATED.3IONS-SCNC: 11 MMOL/L (ref 7–15)
BUN SERPL-MCNC: 16 MG/DL (ref 8–23)
CALCIUM SERPL-MCNC: 9.6 MG/DL (ref 8.8–10.4)
CHLORIDE SERPL-SCNC: 102 MMOL/L (ref 98–107)
CHOLEST SERPL-MCNC: 215 MG/DL
CREAT SERPL-MCNC: 0.94 MG/DL (ref 0.51–0.95)
EGFRCR SERPLBLD CKD-EPI 2021: 68 ML/MIN/1.73M2
FASTING STATUS PATIENT QL REPORTED: YES
FASTING STATUS PATIENT QL REPORTED: YES
GLUCOSE SERPL-MCNC: 99 MG/DL (ref 70–99)
HBA1C MFR BLD: 5.3 % (ref 0–5.6)
HCO3 SERPL-SCNC: 27 MMOL/L (ref 22–29)
HDLC SERPL-MCNC: 52 MG/DL
LDLC SERPL CALC-MCNC: 141 MG/DL
NONHDLC SERPL-MCNC: 163 MG/DL
POTASSIUM SERPL-SCNC: 4.2 MMOL/L (ref 3.4–5.3)
SODIUM SERPL-SCNC: 140 MMOL/L (ref 135–145)
TRIGL SERPL-MCNC: 112 MG/DL
VIT B12 SERPL-MCNC: 821 PG/ML (ref 232–1245)
VIT D+METAB SERPL-MCNC: 49 NG/ML (ref 20–50)

## 2024-09-03 PROCEDURE — 83036 HEMOGLOBIN GLYCOSYLATED A1C: CPT

## 2024-09-03 PROCEDURE — 80048 BASIC METABOLIC PNL TOTAL CA: CPT

## 2024-09-03 PROCEDURE — 36415 COLL VENOUS BLD VENIPUNCTURE: CPT

## 2024-09-03 PROCEDURE — 80061 LIPID PANEL: CPT

## 2024-09-03 PROCEDURE — 82306 VITAMIN D 25 HYDROXY: CPT

## 2024-09-03 PROCEDURE — 99213 OFFICE O/P EST LOW 20 MIN: CPT

## 2024-09-03 PROCEDURE — 82607 VITAMIN B-12: CPT

## 2024-09-03 RX ORDER — ROSUVASTATIN CALCIUM 10 MG/1
10 TABLET, COATED ORAL AT BEDTIME
Qty: 90 TABLET | Refills: 3 | Status: SHIPPED | OUTPATIENT
Start: 2024-09-03

## 2024-09-03 ASSESSMENT — PAIN SCALES - GENERAL: PAINLEVEL: NO PAIN (0)

## 2024-09-03 NOTE — PROGRESS NOTES
Preventive Care Visit  [facility] FARIDA Beckham DNP, Geriatric Medicine  Sep 3, 2024  {Provider  Link to [facility] :864411}    {PROVIDER CHARTING PREFERENCE:746260}    Chadwick Dickens is a 64 year old, presenting for the following:  Establish Care and Physical    {(!) Visit Details have not yet been documented.  Please enter Visit Details and then use this list to pull in documentation. (Optional):537108}     Health Care Directive  Patient does not have a Health Care Directive or Living Will: Discussed advance care planning with patient; however, patient declined at this time.    Here to establish care  Ideally wanting to be off statin. Has been off of it for the past 2 weeks  Has been on Wegovy since March. 12# weight loss. Feels this has been helpful in appetite suppressant and food cravings    History of Present Illness       Reason for visit:  Anual check up                 9/3/2024   General Health   How would you rate your overall physical health? Good   Feel stress (tense, anxious, or unable to sleep) Not at all            9/3/2024   Nutrition   Three or more servings of calcium each day? (!) NO   Diet: Regular (no restrictions)   How many servings of fruit and vegetables per day? (!) 2-3   How many sweetened beverages each day? 0-1            9/3/2024   Exercise   Days per week of moderate/strenous exercise 1 day   Average minutes spent exercising at this level 30 min      (!) EXERCISE CONCERN      9/3/2024   Social Factors   Frequency of gathering with friends or relatives More than three times a week   Worry food won't last until get money to buy more No   Food not last or not have enough money for food? No   Do you have housing? (Housing is defined as stable permanent housing and does not include staying ouside in a car, in a tent, in an abandoned building, in an overnight shelter, or couch-surfing.) Yes   Are you worried about losing your housing? No   Lack of transportation? No    Unable to get utilities (heat,electricity)? No            9/3/2024   Fall Risk   Fallen 2 or more times in the past year? No   Trouble with walking or balance? No             9/3/2024   Dental   Dentist two times every year? Yes            9/3/2024   TB Screening   Were you born outside of the [place]? No            Today's PHQ-2 Score:       9/3/2024     7:41 AM   PHQ-2 (  [employer])   Q1: Little interest or pleasure in doing things 0   Q2: Feeling down, depressed or hopeless 0   PHQ-2 Score 0   Q1: Little interest or pleasure in doing things Not at all   Q2: Feeling down, depressed or hopeless Not at all   PHQ-2 Score 0           9/3/2024   Substance Use   Alcohol more than 3/day or more than 7/wk No   Do you use any other substances recreationally? No        Social History     Tobacco Use    Smoking status: Former     Current packs/day: 0.00     Average packs/day: 1 pack/day for 25.4 years (25.4 ttl pk-yrs)     Types: Cigarettes     Start date:      Quit date: 1996     Years since quittin.2    Smokeless tobacco: Never   Vaping Use    Vaping status: Never Used   Substance Use Topics    Alcohol use: Yes     Comment: 1/month    Drug use: No     {Provider  If there are gaps in the social history shown above, please follow the link to update and then refresh the note Link to Social and Substance History :065221}      10/4/2023   LAST FHS-7 RESULTS   Any ONE woman have BOTH breast AND ovarian cancer No   Any woman with breast cancer before 50yrs No   2 or more relatives with breast AND/OR ovarian cancer No   2 or more relatives with breast AND/OR bowel cancer No        {If any of the questions to the FHS7 are answered yes, consider referral for genetic counseling.    Additional indications for genetic referral include personal history of breast or ovarian cancer, genetic mutation in 1st degree relative which increases risk of breast cancer including BRCA1, BRCA2, REJI, PALB 2, TP53, CHEK2, PTEN, CDH1, STK11  "(per ACS) and/or 1st degree relative with history of pancreatic or high-risk prostate cancer (per NCCN):044373}   {Mammogram Decision Support (Optional):155483}          9/3/2024   One time HIV Screening   Previous HIV test? I don't know          9/3/2024   STI Screening   New sexual partner(s) since last STI/HIV test? No        History of abnormal Pap smear: { :022444}       ASCVD Risk   The 10-year ASCVD risk score (Janee MEADOWS, et al., 2019) is: 4%    Values used to calculate the score:      Age: 64 years      Sex: Female      Is Non- : No      Diabetic: No      Tobacco smoker: No      Systolic Blood Pressure: 138 mmHg      Is BP treated: No      HDL Cholesterol: 61 mg/dL      Total Cholesterol: 170 mg/dL    {Link to Fracture Risk Assessment Tool (Optional):879062}    {Provider  REQUIRED FOR AWV Use the storyboard to review patient history, after sections have been marked as reviewed, refresh note to capture documentation:501644}   Reviewed and updated as needed this visit by Provider   Tobacco   Meds   Med Hx  Surg Hx  Fam Hx  Soc Hx Sexual Activity          {HISTORY OPTIONS (Optional):457677}    {ROS Picklists (Optional):179436}     Objective    Exam  /82 (BP Location: Right arm, Patient Position: Sitting, Cuff Size: Adult Large)   Pulse 82   Temp 97.3  F (36.3  C) (Temporal)   Resp 16   Ht 1.666 m (5' 5.59\")   Wt 91.2 kg (201 lb)   SpO2 100%   BMI 32.85 kg/m     Estimated body mass index is 32.85 kg/m  as calculated from the following:    Height as of this encounter: 1.666 m (5' 5.59\").    Weight as of this encounter: 91.2 kg (201 lb).    Physical Exam  {Exam Choices (Optional):240877}        Signed Electronically by: Jessa Beckham DNP  {Email feedback regarding this note to [email or web address]   :750880}  "

## 2024-09-03 NOTE — LETTER
"September 4, 2024      Chrissie DAS Venancio  [address]  [facility] [address]        Hi Chrissie,     Your vitamin D, vitamin B12, and hemoglobin A1c levels are all within normal range. Your cholesterol levels are elevated, and the LDL (\"bad\" cholesterol) are quite a bit higher than when you were on the statin. Let's plan to stay on the statin as we discussed to help keep your risk low.     Let me know if you have any questions or concerns,       Jessa Beckham, DNP, APRN, CNP     Resulted Orders   Lipid panel reflex to direct LDL Non-fasting   Result Value Ref Range    Cholesterol 215 (H) <200 mg/dL    Triglycerides 112 <150 mg/dL    Direct Measure HDL 52 >=50 mg/dL    LDL Cholesterol Calculated 141 (H) <=100 mg/dL    Non HDL Cholesterol 163 (H) <130 mg/dL    Patient Fasting > 8hrs? Yes     Narrative    Cholesterol  Desirable:  <200 mg/dL    Triglycerides  Normal:  Less than 150 mg/dL  Borderline High:  150-199 mg/dL  High:  200-499 mg/dL  Very High:  Greater than or equal to 500 mg/dL    Direct Measure HDL  Female:  Greater than or equal to 50 mg/dL   Male:  Greater than or equal to 40 mg/dL    LDL Cholesterol  Desirable:  <100mg/dL  Above Desirable:  100-129 mg/dL   Borderline High:  130-159 mg/dL   High:  160-189 mg/dL   Very High:  >= 190 mg/dL    Non HDL Cholesterol  Desirable:  130 mg/dL  Above Desirable:  130-159 mg/dL  Borderline High:  160-189 mg/dL  High:  190-219 mg/dL  Very High:  Greater than or equal to 220 mg/dL   Vitamin D Deficiency   Result Value Ref Range    Vitamin D, Total (25-Hydroxy) 49 20 - 50 ng/mL      Comment:      optimum levels    Narrative    Season, race, dietary intake, and treatment affect the concentration of 25-hydroxy-Vitamin D. Values may decrease during winter months and increase during summer months.    Vitamin D determination is routinely performed by an immunoassay specific for 25 hydroxyvitamin D3.  If an individual is on vitamin D2(ergocalciferol) supplementation, please " specify 25 OH vitamin D2 and D3 level determination by LCMSMS test VITD23.     Vitamin B12   Result Value Ref Range    Vitamin B12 821 232 - 1,245 pg/mL   Hemoglobin A1c   Result Value Ref Range    Hemoglobin A1C 5.3 0.0 - 5.6 %      Comment:      Normal <5.7%   Prediabetes 5.7-6.4%    Diabetes 6.5% or higher     Note: Adopted from ADA consensus guidelines.

## 2024-09-03 NOTE — PROGRESS NOTES
"  Assessment & Plan     Encounter to establish care  Due for physical, however will reschedule. Would like to update labs and refill meds    Mixed hyperlipidemia  Tolerating statin, advised continued use at this time.  Would ultimately like to be off of statin but is agreeable to continue to help lower atherosclerotic cardiovascular disease risk.  Had a coronary calcium CT scan done in 2019 that showed a score of 0  - rosuvastatin (CRESTOR) 10 MG tablet; Take 1 tablet (10 mg) by mouth at bedtime.  - Lipid panel reflex to direct LDL Non-fasting  - Hemoglobin A1c    CORONARY ARTERY CALCIUM SCORES  12/29/2019    Left main coronary artery: 0  Left anterior descending coronary artery: 0  Circumflex coronary artery: 0  Right coronary artery: 0    TOTAL CALCIUM SCORE: 0     Class 1 obesity due to excess calories with serious comorbidity and body mass index (BMI) of 32.0 to 32.9 in adult  Tolerating Wegovy, follows with weight loss clinic.  Slight bump in creatinine noted on last labs, will repeat BMP  - Basic metabolic panel    Screen for colon cancer  Will provide referral to schedule colonoscopy  - Colonoscopy Screening  Referral; Future    Encounter for vitamin deficiency screening  On supplementation, interested in checking vitamin levels  - Vitamin D Deficiency  - Vitamin B12    Patient has been advised of split billing requirements and indicates understanding: Yes        BMI  Estimated body mass index is 32.85 kg/m  as calculated from the following:    Height as of this encounter: 1.666 m (5' 5.59\").    Weight as of this encounter: 91.2 kg (201 lb).   Weight management plan: Patient referred to endocrine and/or weight management specialty Discussed healthy diet and exercise guidelines    Counseling  Appropriate preventive services were addressed with this patient via screening, questionnaire, or discussion as appropriate for fall prevention, nutrition, physical activity, Tobacco-use cessation, social " "engagement, weight loss and cognition.  Checklist reviewing preventive services available has been given to the patient.  Reviewed patient's diet, addressing concerns and/or questions.   She is at risk for lack of exercise and has been provided with information to increase physical activity for the benefit of her well-being.       FUTURE APPOINTMENTS:       - Follow-up for annual visit or as needed  Work on weight loss  Regular exercise    Chadwick Dickens is a 64 year old, presenting for the following health issues:  Establish Care and Physical        9/3/2024     8:30 AM   Additional Questions   Roomed by Atif     Here to establish care and check labs, plans to do physical at a different time  Is tolerating Wegovy and statin.  She notes that it was very difficult to find the correct statin but has worked with cardiology and feels she is tolerating the current well.  Ideally would like to be off of statin but understands its benefit in risk reduction.  Has stopped taking statin about 2 weeks ago to see how her labs would look  Notes about a 12 pound weight loss since starting Wegovy in March.  She has found it particularly helpful in controlling cravings and appetite suppressant      History of Present Illness       Reason for visit:  Anual check up              Review of Systems  Constitutional, HEENT, cardiovascular, pulmonary, gi and gu systems are negative, except as otherwise noted.      Objective    /82 (BP Location: Right arm, Patient Position: Sitting, Cuff Size: Adult Large)   Pulse 82   Temp 97.3  F (36.3  C) (Temporal)   Resp 16   Ht 1.666 m (5' 5.59\")   Wt 91.2 kg (201 lb)   SpO2 100%   BMI 32.85 kg/m    Body mass index is 32.85 kg/m .  Physical Exam  Vitals reviewed.   Constitutional:       Appearance: Normal appearance.   HENT:      Head: Normocephalic.   Eyes:      Pupils: Pupils are equal, round, and reactive to light.   Cardiovascular:      Rate and Rhythm: Normal rate. "   Pulmonary:      Effort: Pulmonary effort is normal. No respiratory distress.   Musculoskeletal:         General: Normal range of motion.   Skin:     General: Skin is warm.   Neurological:      Mental Status: She is alert and oriented to person, place, and time.   Psychiatric:         Mood and Affect: Mood normal.         Behavior: Behavior normal.         Thought Content: Thought content normal.         Judgment: Judgment normal.                    Signed Electronically by: Jessa Beckham, DNP, APRN, CNP

## 2024-09-11 ENCOUNTER — TELEPHONE (OUTPATIENT)
Dept: SURGERY | Facility: CLINIC | Age: 64
End: 2024-09-11
Payer: COMMERCIAL

## 2024-09-11 ENCOUNTER — TELEPHONE (OUTPATIENT)
Dept: GASTROENTEROLOGY | Facility: CLINIC | Age: 64
End: 2024-09-11
Payer: COMMERCIAL

## 2024-09-11 NOTE — TELEPHONE ENCOUNTER
Chrissie called to inquire about stopping her Wegovy prior to her coloscopy on 11/20/24.    Informed her that Wegovy should be held for a full 7 days prior to her procedure and can be restarted afterward.    She expressed understanding.    Eva PETERS RN

## 2024-09-11 NOTE — TELEPHONE ENCOUNTER
"Endoscopy Scheduling Screen    Have you had a positive Covid test in the last 14 days?  No    What is your communication preference for Instructions and/or Bowel Prep?   Mail/USPS    What insurance is in the chart?  Other:  bcbs     Ordering/Referring Provider:     SOHAM MANJARREZ      (If ordering provider performs procedure, schedule with ordering provider unless otherwise instructed. )    BMI: Estimated body mass index is 32.85 kg/m  as calculated from the following:    Height as of 9/3/24: 1.666 m (5' 5.59\").    Weight as of 9/3/24: 91.2 kg (201 lb).     Sedation Ordered  moderate sedation.   If patient BMI > 50 do not schedule in [facility].    If patient BMI > 45 do not schedule at [facility].    Are you taking methadone or Suboxone?  No    Have you had difficulties, pain, or discomfort during past endoscopy procedures?  No    Are you taking any prescription medications for pain 3 or more times per week?   NO, No RN review required.    Do you have a history of malignant hyperthermia?  No    (Females) Are you currently pregnant?   No     Have you been diagnosed or told you have pulmonary hypertension?   No    Do you have an LVAD?  No    Have you been told you have moderate to severe sleep apnea?  No    Have you been told you have COPD, asthma, or any other lung disease?  No    Do you have any heart conditions?  No     Have you ever had or are you waiting for an organ transplant?  No. Continue scheduling, no site restrictions.    Have you had a stroke or transient ischemic attack (TIA aka \"mini stroke\" in the last 6 months?   No    Have you been diagnosed with or been told you have cirrhosis of the liver?   No    Are you currently on dialysis?   No    Do you need assistance transferring?   No    BMI: Estimated body mass index is 32.85 kg/m  as calculated from the following:    Height as of 9/3/24: 1.666 m (5' 5.59\").    Weight as of 9/3/24: 91.2 kg (201 lb).     Is patients BMI > 40 and scheduling location [facility]?  No    Do you " take an injectable medication for weight loss or diabetes (excluding insulin)? - wegovy   Yes, hold time can be up to 7 days. Please consult with you prescribing provider to discuss endoscopy recommendations.     Do you take the medication Naltrexone?  No    Do you take blood thinners?  No       Prep   Are you currently on dialysis or do you have chronic kidney disease?  No    Do you have a diagnosis of diabetes?  No    Do you have a diagnosis of cystic fibrosis (CF)?  No    On a regular basis do you go 3 -5 days between bowel movements?  Yes (Extended Prep)    BMI > 40?  No    Preferred Pharmacy:    [place] [id number] [place] [facility] [address] [facility]  [address] [address]  Phone: [phone] Fax: [phone]      Final Scheduling Details     Procedure scheduled  Colonoscopy    Surgeon:  Shazia      Date of procedure:  11/20/2024     Pre-OP / PAC:   No - Not required for this site.    Location  [facility] - Per order.    Sedation   MAC/Deep Sedation - Patient preference.      Patient Reminders:   You will receive a call from a Nurse to review instructions and health history.  This assessment must be completed prior to your procedure.  Failure to complete the Nurse assessment may result in the procedure being cancelled.      On the day of your procedure, please designate an adult(s) who can drive you home stay with you for the next 24 hours. The medicines used in the exam will make you sleepy. You will not be able to drive.      You cannot take public transportation, ride share services, or non-medical taxi service without a responsible caregiver.  Medical transport services are allowed with the requirement that a responsible caregiver will receive you at your destination.  We require that drivers and caregivers are confirmed prior to your procedure.

## 2024-10-08 ENCOUNTER — HOSPITAL ENCOUNTER (OUTPATIENT)
Dept: MAMMOGRAPHY | Facility: CLINIC | Age: 64
Discharge: HOME | End: 2024-10-08
Payer: COMMERCIAL

## 2024-10-08 DIAGNOSIS — Z12.31 VISIT FOR SCREENING MAMMOGRAM: ICD-10-CM

## 2024-10-08 PROCEDURE — 77063 BREAST TOMOSYNTHESIS BI: CPT

## 2024-10-30 ENCOUNTER — TELEPHONE (OUTPATIENT)
Dept: GASTROENTEROLOGY | Facility: CLINIC | Age: 64
End: 2024-10-30
Payer: COMMERCIAL

## 2024-10-30 DIAGNOSIS — Z12.11 ENCOUNTER FOR SCREENING COLONOSCOPY: Primary | ICD-10-CM

## 2024-10-30 RX ORDER — BISACODYL 5 MG/1
TABLET, DELAYED RELEASE ORAL
Qty: 4 TABLET | Refills: 0 | Status: SHIPPED | OUTPATIENT
Start: 2024-10-30

## 2024-10-30 NOTE — LETTER
October 30, 2024      Dominique Craft  [address]  [facility] [address]              Dear Dominique,        Colonoscopy     Procedure date: 11.20.2024    Anticipated arrival time: 7 AM   (Please note that arrival times may change)    Facility location: [facility]; [address], [place] [address]. Check in location: 2nd Floor     Important Procedure Reminders:     Prep Instructions:   Instructions on how to prepare for your upcoming procedure are found below. Please read instructions carefully. Deviation from instructions may result in less than desired outcomes and procedure may need to be rescheduled.   If you have additional questions regarding how to prepare for your upcoming procedure, contact our endoscopy pre assessment nurses at [phone] option 2 Monday through Friday 7:00am-5:00pm.      Policy:   The medications used during the procedure will make you sleepy, so you won't be able to drive. On the day of your procedure, please have an adult ready to drive you home and stay with you for the next 24 hours.   You can't use public transportation, ride-share services, or non-medical taxi services without a responsible caregiver. Medical transport services are okay, but a caregiver must be there to receive you at your destination.   Make sure your  and caregiver are confirmed before your procedure.    Day of procedure:  Please keep in mind that arrival times may change. Let your  know there might be a one-hour window for changes.  We ask that you please check in at the  with your . Your  should remain on campus.  Expect to be at the procedure center for about 1.5-2.5 hours.    Please do not wear jewelry (i.e. earrings, rings, necklaces, watches, etc). Leave your purse, billfold, credit cards, and other valuables at home.   Bring insurance card and ID.     To cancel or reschedule your procedure:   If you need to cancel or  reschedule, our endoscopy scheduling team can be reached at [phone], option 2. Monday through Friday, 7:00am-5:00pm.    Medication Reminders:    Please note the following medication holding recommendations:   If taking Weight loss injectable medication: Tirzepatide-Weight Management (Zepbound) -or-Semaglutide-Weight Management (WEGOVY). Weekly dosing of medication.  HOLD 7 days before procedure.  Follow up with managing provider.       ---------------------------------------------------------------------------------------------------------------------------------------------------------------------------------------------------------------    Your colonoscopy prep prescription has been sent to [facility] [id number] [place] [facility] [address] [place] [address] [address]    [facility] Extended Bowel Prep   Prep instructions for your colonoscopy     [facility]; [address], [place] [address]. Check in location: 2nd Floor   For prep questions, please call: [facility] -  [phone] option 2    Please read these instructions carefully at least 7 days prior to your colonoscopy procedure. Be sure to follow all directions completely. The inside of your colon must be clean to allow for a complete examination for the presence of any growths, polyps, and/or abnormalities, as well as their biopsy or removal. A number of tips are included in order to make this part of the procedure as comfortable as possible.    Getting ready      prescription at the pharmacy. Endoscopy team will order this about 2 weeks before to your scheduled procedure. Included in the kit will be the followin  Dulcolax (Bisacodyl) 5mg tablets  Two containers of Polyethylene glycol (Golytely, Colyte, Nulytely, Gavilyte-G)    A nurse will call you to go over your appointment details and prep instructions.     You must arrange for an adult to drive you home after your exam. Your colonoscopy cannot be done  unless you have a ride. If you need to use public transportation, someone must ride with you and stay with you for up to 24 hours.       7 days before procedure     Medications that may need to be held before procedure:     GLP-1 agonist medication for diabetes or weight loss: such as Mounjaro (Tirzepatide).  Ozempic (Semaglutide). Rybelsus (Semaglutide), Tirzepatide-Weight Management (Zepbound), Wegovy (Semaglutide) or others, holding times may vary based on how you take this medication. This may be up to a 7 day hold. Our pre assessment nurses will call and discuss holding recommendations 1-2 weeks before scheduled procedure.     Blood thinning and/or anti platelet medications: such as Coumadin, Plavix, Xarelto, Eliquis, Lovenox or others, ask your your prescribing provider about holding recommendations.     If you take insulin for diabetes, ask your prescribing provider for instructions on how to manage this medication while preparing for a colonoscopy.     Stop taking iron (ferrous sulfate), multivitamins that contain iron, and/or fiber supplements (Metamucil, Benefiber, Psyllium husk powder, Fibercon, Bran, etc.) 7 days before procedure.     Stop eating whole kernel corn, popcorn, nuts, and foods that contain seeds. These can stay in the colon for many days and they can clog up the colonoscope.     Begin a low-fiber diet. (See examples below). No Olestra (a fat substitute).   Consume no more than 10-15 grams of fiber each day.       LOW FIBER DIET   You may have: Do not have:    Starches: White bread, rolls, biscuits, croissants, Lyons toast, white flour tortillas, waffles, pancakes, Syriac toast; white rice, noodles, pasta, macaroni; cooked and peeled potatoes; plain crackers, saltines; cooked farina or cream of rice; puffed rice, corn flakes, Rice Krispies, Special K      Vegetables: tender cooked and canned, vegetable broths     Fruits and fruit juices: Strained fruit juice, canned fruit without seeds or  skin (not pineapple), applesauce, pear sauce, ripe bananas, melons (not watermelon)     Milk products: Milk (plain or flavored), cheese, cottage cheese, yogurt (no berries), custard, ice cream       Proteins: Tender, well-cooked ground beef, lamb, veal, ham, pork, chicken, turkey, fish or organ meat, Tofu, eggs, creamy peanut butter      Fats and condiments: Margarine, butter, oils, mayonnaise, sour cream, salad dressing, plain gravy; spices, cooked herbs; sugar, clear jelly, honey, syrup      Snacks, sweets and drinks: Pretzels, hard candy; plain cakes and cookies (no nuts or seeds); gelatin, plain pudding, sherbet, Popsicles; coffee, tea, carbonated ( fizzy ) drinks  Starches: Breads or rolls that contain nuts, seeds or fruit; whole wheat or whole grain breads that contain more than 2 grams of fiber per serving; cornbread; corn or whole wheat tortillas; potatoes with skin; brown rice, wild rice, quinoa, kasha (buckwheat), and oatmeal      Vegetables: Any raw or steamed vegetables; vegetables with seeds; corn in any form      Fruits and fruit juices: Prunes, prune juice, raisins and other dried fruits, berries and other fruits with seeds, canned pineapple juices with pulp such as orange, grapefruit, pineapple or tomato juice     Milk products: Any yogurt with nuts, seeds or berries      Proteins: Tough, fibrous meats with gristle; cooked dried beans, peas or lentils; crunchy peanut butter     Fats and condiments: Pickles, olives, relish, horseradish; jam, marmalade, preserves      Snacks, sweets and drinks: Popcorn, nuts, seeds, granola, coconut, candies made with nuts or seeds; all desserts that contain nuts, seeds, raisins and other dried fruits, coconut, whole grains or bran.       2 days before procedure       You may have a light, low fiber breakfast and lunch. Begin a clear liquid diet AFTER 1 PM. Do not eat solid foods. (See examples below).    Drink at least eight to ten 8-ounce glasses of water throughout  the day  ? ? ? ? ? ? ? ?    Fill one container of Golytely with a full gallon of warm water. Cover and shake until well mixed. Chill in refrigerator until it is time to drink solution.     CLEAR LIQUID DIET:  You can have: Do not have:    Water, tea, coffee (no milk or cream)   Soda pop, Gatorade (not red or purple)   Coconut water   Jell-O, Popsicles (no milk or fruit pieces - not red or purple)   Fat-free soup broth or bouillon   Plain hard candy, such as clear life savers (not red or purple)   Clear juices and fruit-flavored drinks, such as apple juice, white grape juice, Hi-C, and Olayinka-Aid (not red or purple)    Milk or milk products such as ice cream, malts or shakes, or coffee creamer   Red or purple drinks of any kind such as cranberry juice, grape juice, or Olayinka-Aid. Avoid red or purple Jell-O, Popsicles, sorbet, sherbet and candy.   Juices with pulp such as orange, grapefruit, pineapple, or tomato juice   Cream soups of any kind   Alcohol and beer   Protein drinks or protein powder     Step 1     At 4 PM, take 2 Dulcolax (Bisacodyl) tablets.  At 5 PM, start drinking one 8-ounce glass of Golytely mixture every 15 minutes until the container is HALF empty. Drink each glass quickly. Store the rest in the refrigerator.   Continue to drink clear liquids.      Reminders While Drinking Laxatives:     After you start drinking the solution, stay near a toilet. You may have watery stools (diarrhea), mild cramping, bloating, and nausea. You may want to use Vaseline on the skin around your anus after each bowel movement or use wet wipes to prevent irritation. Bowel movements will be liquid and dark in color at first and then should turn clear yellow in color. Drinking the prepared solution may make you cold, wearing warm clothing can be helpful.    Some find it helpful to:  For added flavor, include a crystal light lemonade packet in each glass of Golytely, rather than mixing it into the entire prepared mixture.  In  between each glass, try sucking on a lemon or a piece of hard candy to help diminish the flavor of the preparation.  Drinking from a straw can help minimize the taste of the prepared mixture.    If you have nausea or vomiting during drinking the solution, rinse your mouth with water and take a 15-30 minute break and then continue drinking solution.       1 day before procedure       Continue on a clear liquid diet. Do not eat solid foods.    Drink at least eight to ten 8-ounce glasses of water throughout the day  ? ? ? ? ? ? ? ?    Step 2     At 4 PM, take 2 Dulcolax (Bisacodyl) tablets.  At 5 PM, start drinking the 2nd half of Golytely mixture. Drink one 8-ounce glass of Golytely mixture every 15 minutes until the container is empty.  Drink each glass quickly.   Continue to drink clear liquids.    Before you go to bed mix the second container of Golytely and place in the refrigerator for the morning.     Day of procedure     Step 3     6 hours before your arrival time, start drinking one 8-ounce glass of Golytely mixture every 15 minutes until the container is HALF empty. You will not drink the entire container. The remaining solution can be discarded.     2 hours before your arrival time stop drinking all liquids, including water.   Do not smoke or swallow anything, including water or gum for at least 2 hours before your arrival time. This is a safety issue. Your procedure could be cancelled if you do not follow directions.  No chewing tobacco 6 hours prior to procedure arrival time.     You may take your necessary morning medications with sips of water (4 ounces).   Do not take diabetes medicine by mouth until after your exam.  If you have asthma, bring your inhalers.  Please perform your nebulizer treatments and airway clearance therapy in the morning prior to the procedure (if applicable).    Arrive with a responsible adult who can drive you home and stay with you for a minimum of 24 hours. The medications used  during the procedure will make you sleepy, so you won't be able to drive yourself home.   You cannot use public transportation, ride-share services, or non-medical taxi services without a responsible caregiver. Medical transport services are okay, but a caregiver must be there to receive you at your destination.  Please check in with your  when you arrive. Drivers should stay on campus.    Expect to be at the procedure center for about 1.5-2.5 hours.    Do not wear jewelry (i.e. earrings, rings, necklaces, watches, etc.). Leave your purse, billfold, credit cards, and other valuables at home.      Bring insurance card and ID.                                                             Answers to Commonly Asked Questions     How soon can I eat after the procedure?  You may resume your normal diet when you feel ready, unless advised otherwise by the doctor performing your procedure. We recommend starting with a light meal.   Do not drink alcohol for 24 hours after your procedure.  You may resume normal activities (work, exercise, etc.) after 24 hours.    How will I feel after the procedure?  It is normal to feel bloated and gassy after your procedure. Walking will help move the air through your colon. You can take non-aspirin pain relievers that contain acetaminophen (Tylenol).  If you are having sedation, we require a responsible adult to take you home for your safety. The sedation medicines used to relax you during the procedure can impair your judgement and reaction time and make you forgetful and possibly a little unsteady.  Do not drive, make any important decisions, or sign any legal documents for 24 hours after your procedure.    When will I get my test results?  You should have your procedure results and any lab results (if applicable) by letter, MyChart message, or phone call within 2 weeks. If you have any questions, please call the doctor that referred you for the procedure.    How do I know if my  colon is cleaned out?   After completing the bowel prep, your bowel movements should be all liquid and yellow. Your bowel movements will look similar to urine in the toilet. If there are pieces of stool (poop) in the toilet, or if you can't see to the bottom of the toilet, please call our office for advice. Call [phone] and ask to speak with a nurse.    Why is the Golytely bowel prep taken in several steps?   The stool is flushed out by a large wave of fluid going through the colon. Just sipping a large volume of the solution will not achieve the desired result. Studies have shown that two smaller waves (or more in some cases) are better than one large one.      What if I need to cancel or reschedule my procedure?  Contact our endoscopy scheduling team at [phone], option 2. Monday through Friday, 7:00am-5:00pm.

## 2024-10-30 NOTE — TELEPHONE ENCOUNTER
Per 3.28.2014 colonoscopy report  She required increased doses of MAC anesthesia to achieve comfort.This        may have been due to adhesions from prior abdominal surgeries.     DEEPAK sent to [facility].  --------------------------------------------------------------------------------------------------------------------      Pre visit planning completed.      Procedure details:    Patient scheduled for Colonoscopy on 11.20.2024.     Arrival time: 0700. Procedure time 0800    Facility location: [facility]; [address] [place] [address] [address], [place] [address]. Check in location: 2nd Floor.    Sedation type: MAC    Pre op exam needed? No.    Indication for procedure: screening colonoscopy      Chart review:     Electronic implanted devices? No    Recent diagnosis of diverticulitis within the last 6 weeks? No      Medication review:    Diabetic? No    Anticoagulants? No    Weight loss medication/injectable? If patient is taking Zepbound -or- Semaglutide-Weight Management (WEGOVY). Weekly dosing of medication.  HOLD 7 days before procedure.  Follow up with managing provider.     Other medication HOLDING recommendations:  N/A      Prep for procedure:     Bowel prep recommendation: Extended Golytely. Bowel prep prescription sent to [place] [id number] [place] [address]   Due to: GLP-1 agonist medication noted in chart.     Prep instructions sent via letter         Ariadna Reyna RN  Endoscopy Procedure Pre Assessment RN  [phone] option 2

## 2024-11-06 ENCOUNTER — OFFICE VISIT (OUTPATIENT)
Dept: ENDOCRINOLOGY | Facility: CLINIC | Age: 64
End: 2024-11-06
Payer: COMMERCIAL

## 2024-11-06 VITALS
DIASTOLIC BLOOD PRESSURE: 73 MMHG | WEIGHT: 196 LBS | BODY MASS INDEX: 32.03 KG/M2 | HEART RATE: 80 BPM | SYSTOLIC BLOOD PRESSURE: 107 MMHG

## 2024-11-06 DIAGNOSIS — E04.2 NON-TOXIC MULTINODULAR GOITER: Primary | ICD-10-CM

## 2024-11-06 PROCEDURE — G2211 COMPLEX E/M VISIT ADD ON: HCPCS | Performed by: INTERNAL MEDICINE

## 2024-11-06 PROCEDURE — 99214 OFFICE O/P EST MOD 30 MIN: CPT | Performed by: INTERNAL MEDICINE

## 2024-11-06 NOTE — PROGRESS NOTES
Recent issues:  Thyroid nodule follow-up evaluation  Reports feeling well, success with wt loss 13# on Weygovy x 8 mo  Reviewed medical history from patient, thyroid U/S data, and Epic chart record        ~2012. Diagnosis of thyroid gland problem, details not available  Previous medical evaluations with Dr Suzy Morales and Cornelia Brasher/Katia Flores  Recalls having elevated cholesterol level, then advised to start lipid medication  Took simvastatin medication and then noted throat symptoms  Reports looking at internet information that indicated simvastatin associated with thyroid problems  1/13/12 FNA report ([facility]):  LLL ((#8) and RML (#2) nodules- benign cytology.    Diagnosis of thyroid nodules, has seen Dr. Shane Portillo for endocrinology evaluations  Diagnosis of multinodular goiter and subacute thyroiditis, but limited medical records available   Has had thyroid U/S imaging previously  She recalls having FNA biopsies... of 5 different nodules, benign results     Took thyroid medication for approx 2 months, then discontinued  No subsequent thyroid med use  11/16/21 thyroid U/S ([facility]):  1.9 cm complex cystic nodule in isthmus  6/16/22 Thyroid U/S:   Right lobe 4.6 x 1.7 x 1.8 cm and left lobe 4.6 x 2.5 x 2.3 cm. Homogeneous echotexture.    1. RUL 1.0 x 0.8 x 0.8 cm solid nodule   2. RUL 1.1 x 0.9 x 0.5 cm solid nodule    3. RML-RLL 1.0 x 1.0 x 0.7 cm solid nodule    4. RLL 1.0 x 0.8 x 0.6 cm solid nodule    5. MARCUS-LML 1.0 x 0.8 x 0.6 cm solid nodule   6. LML 0.8 x 0.8 x 0.5 cm solid nodule    8. LLL 2.9 x 1.9 x 1.9 cm solid nodule    9. Isthmus 1.2 x 1.1 x 0.4 cm solid nodule    10. RML 1.2 x 0.6 x 1.0 cm cystic nodule, new                                                    Few mildly prominent cervical lymph nodes including 0.8 x 0.6 x 0.5 cm lymph node in the right mid neck area with normal fatty hilum, indeterminate  Additional health history:  Neck radiation treatment: None  Fam Hx thyroid  "disease: Mother, sister- goiter, took \"radiation treatment\"  Previous [facility] thyroid tests include:   Lab Test 06/10/22  0742   TSH 0.66         11/15/22. Initial thyroid evaluation with me at [place]  Reviewed health history and thyroid nodule issues.  Recommended observation plan for the multiple thyroid nodules, follow-up thyroid U/S Fall '23    If significant size enlargement of thyroid nodules and size >1.5 cm diameter, consider repeat thyroid nodule FNA biopsy(s).    If neck discomfort related to the thyroid nodules, consider thyroid surgery consultation for partial vs total thyroidectomy surgery.  11/20/23 Thyroid U/S (reported):  Right lobe 4.2 x 1.9 x 1.6 cm and left lobe 4.8 x 2.2 x 2.5 cm. Homogeneous echotexture.  RUL 1.1 x 0.9 x 0.5 cm mixed cystic and solid nodule  2. RML 1.0 x 0.8 x 0.7 cm solid nodule  3. LLL 2.9 x 2.0 x 1.9 cm solid nodule  4. Isthmus 1.1 x 1.1 x 0.4 cm solid nodule                                                    Also a 1.0 x 0.6 x 0.5 cm lymph node in the right neck at level 3 is nonspecific and may be reactive.  11/20/23 Thyroid U/S (worksheet data):   Right lobe 4.2 x 1.9 x 1.6 cm and left lobe 4.8 x 2.2 x 2.5 cm. Homogeneous echotexture.   1. RUL 1.0 x 0.8 x 0.9 cm complex nodule   2. RUL 1.1 x 0.9 x 0.5 cm complex nodule   3. RML 1.0 x 0.8 x 0.7 cm solid nodule   4. RLL 1.0 x 0.8 x 0.6 cm mostly solid nodule   5. MARCUS 1.0 x 0.8 x 0.6 cm complex nodule   6. MARCUS 0.9 x 0.8 x 0.5 cm mostly solid nodule   7. LML 1.0 x 0.8 x 0.7 cm complex nodule   8. LLL 2.9 x 2.0 x 1.9 cm solid nodule   9. Isthmus 1.1 x 1.1 x 0.4 cm mostly solid nodule   10. 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 Camryn Graham/[facility] for general medicine evaluations.    PMH/PSH:  Past Medical History:   Diagnosis Date "    Anemia     DVT (deep venous thrombosis) (H)     after tummy tuck in , right leg    Epistaxis     ENT    Gallstones     s/p lisa    GERD (gastroesophageal reflux disease)     Hyperlipidemia     Insomnia     Nephrolithiasis     Obesity (BMI 30-39.9)     PE (pulmonary embolism)     after tummy tuch 2006    Plantar fibromatosis     Thyroid nodules     benign and biopsied, previously on Sythroid     Past Surgical History:   Procedure Laterality Date    APPENDECTOMY OPEN  1981    CHOLECYSTECTOMY  1993    COLONOSCOPY  2014    Procedure: COLONOSCOPY;  COLONOSCOPY (MAC SEDATION);  Surgeon: Thais Gregory MD;  Location: [facility]    COSMETIC ABDOMINOPLASTY  2006 and 2007    tummy tuck and breast lift , revision of abdominoplasty in      HYSTERECTOMY  2008    prophylactic due to FH of cancer    HYSTERECTOMY, PAP NO LONGER INDICATED         Family Hx:  Family History   Problem Relation Age of Onset    Cancer Sister         ovarian cancer    Obesity Sister         s/p Vitor-en-y    Cancer Mother         kidney, colon and liver    Cancer - colorectal Mother          Social Hx:  Social History     Socioeconomic History    Marital status:      Spouse name: Not on file    Number of children: 0    Years of education: Not on file    Highest education level: Not on file   Occupational History    Occupation:      Employer: [employer]   Tobacco Use    Smoking status: Former     Current packs/day: 0.00     Average packs/day: 1 pack/day for 25.4 years (25.4 ttl pk-yrs)     Types: Cigarettes     Start date:      Quit date: 1996     Years since quittin.4    Smokeless tobacco: Never   Vaping Use    Vaping status: Never Used   Substance and Sexual Activity    Alcohol use: Yes     Comment: 1/month    Drug use: No    Sexual activity: Yes     Partners: Male     Birth control/protection: Other     Comment: hysterectomy   Other Topics Concern     Service No    Blood  Transfusions No    Caffeine Concern Yes    Occupational Exposure No    Hobby Hazards No    Sleep Concern Yes    Stress Concern Yes    Weight Concern Yes    Special Diet No    Back Care No    Exercise Yes    Bike Helmet No    Seat Belt Yes    Self-Exams Yes    Parent/sibling w/ CABG, MI or angioplasty before 65F 55M? No   Social History Narrative         Social Drivers of Health     Financial Resource Strain: Low Risk  (9/3/2024)    Financial Resource Strain     Within the past 12 months, have you or your family members you live with been unable to get utilities (heat, electricity) when it was really needed?: No   Food Insecurity: Low Risk  (9/3/2024)    Food Insecurity     Within the past 12 months, did you worry that your food would run out before you got money to buy more?: No     Within the past 12 months, did the food you bought just not last and you didn t have money to get more?: No   Transportation Needs: Low Risk  (9/3/2024)    Transportation Needs     Within the past 12 months, has lack of transportation kept you from medical appointments, getting your medicines, non-medical meetings or appointments, work, or from getting things that you need?: No   Physical Activity: Insufficiently Active (9/3/2024)    Exercise Vital Sign     Days of Exercise per Week: 1 day     Minutes of Exercise per Session: 30 min   Stress: No Stress Concern Present (9/3/2024)    [employer] [place] [employer] - Occupational Stress Questionnaire     Feeling of Stress : Not at all   Social Connections: Unknown (9/3/2024)    Social Connection and Isolation Panel [NHANES]     Frequency of Communication with Friends and Family: Not on file     Frequency of Social Gatherings with Friends and Family: More than three times a week     Attends Congregational Services: Not on file     Active Member of Clubs or Organizations: Not on file     Attends Club or Organization Meetings: Not on file     Marital Status: Not on file    Interpersonal Safety: Low Risk  (9/3/2024)    Interpersonal Safety     Do you feel physically and emotionally safe where you currently live?: Yes     Within the past 12 months, have you been hit, slapped, kicked or otherwise physically hurt by someone?: No     Within the past 12 months, have you been humiliated or emotionally abused in other ways by your partner or ex-partner?: No   Housing Stability: Low Risk  (9/3/2024)    Housing Stability     Do you have housing? : Yes     Are you worried about losing your housing?: No          MEDICATIONS:  has a current medication list which includes the following prescription(s): vitamin d-3, cyanocobalamin, rosuvastatin, semaglutide-weight management, bisacodyl, polyethylene glycol, and wegovy.    ROS:     ROS: 10 point ROS neg other than the symptoms noted above in the HPI.    GENERAL: some fatigue, wt stable; denies fevers, chills, night sweats.   HEENT: denies dysphagia, odonophagia, diplopia, neck pain  THYROID:  no apparent hyper or hypothyroid symptoms  CV: no chest pain, pressure, palpitations  LUNGS: no SOB, SLOAN, cough, wheezing   ABDOMEN: some reflux; no diarrhea, constipation, abdominal pain  EXTREMITIES: no rashes, ulcers, edema  NEUROLOGY: no headaches, denies changes in vision, tingling, extremitiy numbness   MSK: denies other arthralgias or muscle weakness  SKIN: no rashes or lesions  : no menses since hysterectomy mid 40's  PSYCH:  stable mood, no significant anxiety or depression  ENDOCRINE: no heat or cold intolerance    Physical Exam   VS: /73   Pulse 80   Wt 88.9 kg (196 lb)   BMI 32.03 kg/m    GENERAL: AXOX3, NAD, well dressed, answering questions appropriately, appears stated age.  THYROID:  mildly enlarged thyroid with L>R asymmetry, size estimate 30 gm, no neck adenopathy noted  HEENT: neck non-tender, no exopthalmous, no proptosis, EOMI  CV: RRR, no rubs, gallops, no murmurs  LUNGS: CTAB, no wheezes, rales, or ronchi  ABDOMEN: mildly  obese, soft, nontender, nondistended  EXTREMITIES: no edema, no lesions  NEUROLOGY: CN grossly intact, no tremors  MSK: grossly intact  SKIN: no rashes, no lesions    LABS:    All pertinent notes, labs, and images personally reviewed by me.     A/P:  Encounter Diagnosis   Name Primary?    Non-toxic multinodular goiter Yes       Comments:  Reviewed health history and complicated thyroid nodule issues.  We reviewed the [facility] thyroid U/S worksheet data today  Reviewed and interpreted tests that I previously ordered.   Ordered appropriate tests for the endocrinology disease management.    Management options discussed and implemented after shared medical decision making with the patient.  MNG problem is chronic-stable     Plan:  Discussed general issues with the thyroid nodule diagnosis and management  We reviewed highlights of the patient's previous thyroid ultrasound information available  Discussed lab tests used to assess patient thyroid hormone levels  Reviewed the thyroid nodule fine needle aspiration (FNA) biopsy option  We discussed treatment options with observation plan vs thyroid surgery     Recommend:  Continue the observation plan for the MNG at this time  Monitor for neck symptom changes  Advise repeat thyroid lab testing soon  Testing at nearest [facility] clinic   Lab orders placed  Repeat thyroid U/S in 10/2025   Scan at [facility]   Will track all of the thyroid gland nodules   Procedure order placed  If significant size enlargement of thyroid nodules and size >1.5 cm, consider repeat FNA biopsy(s)  If neck discomfort related to the MNG, consider thyroid surgery consultation for partial vs total thyroidectomy surgery  Discussed postsurgical hypothyroidism and levothyroxine med treatment routine  Contact me if questions about management plan     Addressed patient questions today     The longitudinal plan of care for the endocrine problem(s) were addressed during this visit.  Due to  added complexity of care,   we will continue to support the patient and the subsequent management of this condition with ongoing continuity of care.    There are no Patient Instructions on file for this visit.    Future labs ordered today:   Orders Placed This Encounter   Procedures    US Thyroid    TSH    Calcium     Radiology/Consults ordered today: US THYROID    Total time spent on day of encounter:  35 min    Follow-up:  10/2025 or 11/2025, Return (after scan)    MARI Dominguez MD, MS  Endocrinology  [facility]    CC:  ZACKERY Beckham

## 2024-11-06 NOTE — TELEPHONE ENCOUNTER
Pre assessment completed for upcoming procedure.   (Please see previous telephone encounter notes for complete details)      Procedure details:    Arrival time and facility location reviewed.    Pre op exam needed? No.    Designated  policy reviewed. Instructed to have someone stay 24  hours post procedure.       Medication review:    Medications reviewed. Please see supporting documentation below. Holding recommendations discussed (if applicable).   Weight loss injectable medication: Semaglutide-Weight Management (WEGOVY). Weekly dosing of medication.  HOLD 7 days before procedure.  Follow up with managing provider.  Patient confirmed she takes Wegovy injections weekly but her doctor advised she stop it for 2 weeks prior to the procedure so that is what she will do.       Prep for procedure:     Patient confirmed she received bowel prep instructions via letter.  Patient also confirmed she picked up bowel prep scripts already.     Patient stated they have already reviewed the bowel prep instructions and writer answered all patient questions (if applicable). NPO instructions reviewed.    Patient was instructed to call if any questions or concerns arise.       Any additional information needed:  N/A      Patient  verbalized understanding and had no questions or concerns at this time.      Mohini Ballard RN  Endoscopy Procedure Pre Assessment   [phone] option 2

## 2024-11-11 ENCOUNTER — LAB (OUTPATIENT)
Dept: LAB | Facility: CLINIC | Age: 64
End: 2024-11-11
Payer: COMMERCIAL

## 2024-11-11 DIAGNOSIS — Z11.59 NEED FOR HEPATITIS C SCREENING TEST: ICD-10-CM

## 2024-11-11 DIAGNOSIS — Z11.4 SCREENING FOR HIV (HUMAN IMMUNODEFICIENCY VIRUS): Primary | ICD-10-CM

## 2024-11-11 DIAGNOSIS — E04.2 NON-TOXIC MULTINODULAR GOITER: ICD-10-CM

## 2024-11-11 LAB
CALCIUM SERPL-MCNC: 10.1 MG/DL (ref 8.8–10.4)
HCV AB SERPL QL IA: NONREACTIVE
HIV 1+2 AB+HIV1 P24 AG SERPL QL IA: NONREACTIVE
TSH SERPL DL<=0.005 MIU/L-ACNC: 0.79 UIU/ML (ref 0.3–4.2)

## 2024-11-11 PROCEDURE — 84443 ASSAY THYROID STIM HORMONE: CPT

## 2024-11-11 PROCEDURE — 82310 ASSAY OF CALCIUM: CPT

## 2024-11-11 PROCEDURE — 36415 COLL VENOUS BLD VENIPUNCTURE: CPT

## 2024-11-11 PROCEDURE — 87389 HIV-1 AG W/HIV-1&-2 AB AG IA: CPT

## 2024-11-11 PROCEDURE — 86803 HEPATITIS C AB TEST: CPT

## 2024-11-11 NOTE — LETTER
November 14, 2024      Chrissie Craft  [address]  [facility] [address]        Hi Chrissie,     Your HIV and hepatitis C screening tests are negative (normal).       Resulted Orders   HIV Antigen Antibody Combo   Result Value Ref Range    HIV Antigen Antibody Combo Nonreactive Nonreactive      Comment:      Negative HIV-1 p24 antigen and HIV-1/2 antibody screening test results usually indicate the absence of HIV-1 and HIV-2 infection. However, such negative results do not rule-out acute HIV infection.  If acute HIV-1 or HIV-2 infection is suspected, detection of HIV-1 or HIV-2 RNA  is recommended. This result is obtained using the Roche Elecsys HIV Duo method on the tru [id number] immunoassay analyzer.   Hepatitis C Screen Reflex to HCV RNA Quant and Genotype   Result Value Ref Range    Hepatitis C Antibody Nonreactive Nonreactive      Comment:      A nonreactive screening test result does not exclude the possibility of exposure to or infection with HCV. Nonreactive screening test results in individuals with prior exposure to HCV may be due to antibody levels below the limit of detection of this assay or lack of reactivity to the HCV antigens used in this assay. Patients with recent HCV infections (<3 months from time of exposure) may have false-negative HCV antibody results due to the time needed for seroconversion (average of 8 to 9 weeks).       If you have any questions or concerns, please call the clinic at the number listed above.       Sincerely,      Jessa Beckham DNP

## 2024-11-20 ENCOUNTER — TRANSFERRED RECORDS (OUTPATIENT)
Dept: HEALTH INFORMATION MANAGEMENT | Facility: CLINIC | Age: 64
End: 2024-11-20
Payer: COMMERCIAL

## 2024-11-21 ENCOUNTER — PATIENT OUTREACH (OUTPATIENT)
Dept: GASTROENTEROLOGY | Facility: CLINIC | Age: 64
End: 2024-11-21
Payer: COMMERCIAL

## 2025-01-27 ENCOUNTER — OFFICE VISIT (OUTPATIENT)
Dept: SURGERY | Facility: CLINIC | Age: 65
End: 2025-01-27
Payer: COMMERCIAL

## 2025-01-27 VITALS
BODY MASS INDEX: 29.7 KG/M2 | DIASTOLIC BLOOD PRESSURE: 86 MMHG | HEART RATE: 82 BPM | HEIGHT: 66 IN | SYSTOLIC BLOOD PRESSURE: 131 MMHG | WEIGHT: 184.8 LBS | OXYGEN SATURATION: 96 %

## 2025-01-27 DIAGNOSIS — E78.5 ELEVATED LIPIDS: ICD-10-CM

## 2025-01-27 DIAGNOSIS — Z86.39 HX OF OBESITY: Primary | ICD-10-CM

## 2025-01-27 DIAGNOSIS — E66.3 OVERWEIGHT (BMI 25.0-29.9): ICD-10-CM

## 2025-01-27 PROBLEM — E66.811 CLASS 1 OBESITY DUE TO EXCESS CALORIES WITH SERIOUS COMORBIDITY AND BODY MASS INDEX (BMI) OF 32.0 TO 32.9 IN ADULT: Status: RESOLVED | Noted: 2023-11-15 | Resolved: 2025-01-27

## 2025-01-27 PROBLEM — E66.09 CLASS 1 OBESITY DUE TO EXCESS CALORIES WITH SERIOUS COMORBIDITY AND BODY MASS INDEX (BMI) OF 32.0 TO 32.9 IN ADULT: Status: RESOLVED | Noted: 2023-11-15 | Resolved: 2025-01-27

## 2025-01-27 PROCEDURE — 99213 OFFICE O/P EST LOW 20 MIN: CPT | Performed by: PHYSICIAN ASSISTANT

## 2025-01-27 PROCEDURE — G2211 COMPLEX E/M VISIT ADD ON: HCPCS | Performed by: PHYSICIAN ASSISTANT

## 2025-01-27 NOTE — PROGRESS NOTES
2025      Return Medical Weight Management Note     Dominique Craft  MRN:  [id number]  :  1960    Assessment & Plan   Problem List Items Addressed This Visit       Elevated lipids     Anticipate improvement with weight loss.           Hx of obesity - Primary     Patient was congratulated on wt loss success thus far. Healthy habits to assist with further weight loss were discussed. Chrissie will continue Wegovy till supply is finished and then pivot to [facility] semaglutide at 1 mg weekly d/t cost           Overweight (BMI 25.0-29.9)        AOM Considerations:  Phentermine:  lomaira started 23   Topiramate: hx of stones, Current birth control: hyesterectomy          GLP-1: wegovy, saxenda appear covered - she is familiar w/wegovy and researched it. Discussed        Naltrexone:        Wellbutrin:         Metformin: used previously for preDM             Contrave: appear covered  Qsymia: avoid d/t kidney stones      PATIENT INSTRUCTIONS:  Pt Instructions:  Continue 2.4 mg Wegovy weekly for the next 4 weeks and then let's do a short taper of dosing 2.4 mg Wegovy every 14 days to finish the supply. When you're about 2-3 weeks from being done with your Wegovy supply - give us a call: Nursing line number: [phone] to let us know for sending the 1 mg [place] compounded semaglutide weekly for continued therapy.     Goals:  Focus on healthy food choices - prioritizing protein and veggies in your meals. I recommend eating every 4-6 hours to reduce the risk of low blood sugars and try to minimize snacking between meals. Stay well hydrated though the day as well.  Great job with exercising - please continue to invest in yourself with regular exercise. Continue to strive for a range for 150-300 minutes of exercise for weight maintenance and incorporating strength training twice-three times a week to help preserve muscle!      Follow up:    Call [phone] to schedule next visit in next available. Be in touch on  [facility] for refills/concerns between visits      24 minutes spent on the date of the encounter doing chart review, history and exam, result review, counseling, developing plan of care, documentation, and further activities as noted      INTERVAL HISTORY:  Chrissie returns for medical weight management follow up.  Last seen on 8/21/2024 with myself.  Plan at that time to try to switch from Wegovy to Zepbound for efficacy -while this was approved co-pay was much higher and cost prohibitive.  Therefore we switched to 2.4 mg Wegovy.    Does feel this dose is better - better weight response. Noting clothing sections changes.    Reports no bothersome side effects. Feels more mental clarify in particular - less busy on food.    Getting meals in - holidays a challenge with snacks that were out with cookies. But did better this year! Making better snack selections.    Doing raisin bran/fairlife milk. Or JimmyDean egg white sandwhiches    Will do apple PB for lunch    Dinner like normal.    Noting smaller portions -3/4 steak and 1/2 baked potato for example.    Also noting ending clean plate club.    Exercise - doing resistance bands. Some less walking with the weather.  Enjoys walking outside - thinking about gardening and a walking pad on the porch.    WEIGHT METRICS:  Body mass index is 29.83 kg/m .   Current Weight: 184 lb 12.8 oz (83.8 kg)  Last Visits Weight: 201 lb (91.2 kg)  Initial Weight (lbs): 212 lbs  Cumulative weight loss (lbs): 27.2  Weight Loss Percentage: 12.83%    Wt Readings from Last 10 Encounters:   01/27/25 184 lb 12.8 oz (83.8 kg)   11/06/24 196 lb (88.9 kg)   09/03/24 201 lb (91.2 kg)   08/21/24 201 lb (91.2 kg)   04/17/24 212 lb (96.2 kg)   01/11/24 207 lb (93.9 kg)   01/11/24 207 lb (93.9 kg)   11/16/23 206 lb (93.4 kg)   11/15/23 206 lb (93.4 kg)   06/28/23 178 lb 9.6 oz (81 kg)             LABS:  Labs reviewed in Epic    9/3/2024 BMP normal    BP Readings from Last 6 Encounters:   01/27/25 131/86  "  11/06/24 107/73   09/03/24 138/82   08/21/24 99/60   04/17/24 124/78   01/11/24 124/84       Pulse Readings from Last 6 Encounters:   01/27/25 82   11/06/24 80   09/03/24 82   08/21/24 86   04/17/24 90   01/11/24 84       PE:  /86   Pulse 82   Ht 5' 6\" (1.676 m)   Wt 184 lb 12.8 oz (83.8 kg)   SpO2 96%   BMI 29.83 kg/m    GENERAL: Healthy, alert and no distress  EYES: Eyes grossly normal to inspection.    RESP: No audible wheeze, cough, or visible cyanosis.  No increased work of breathing.    SKIN: Visible skin clear. No significant rash, abnormal pigmentation or lesions.  NEURO: Mentation and speech appropriate for age.  PSYCH: Mentation appears normal, affect normal/bright, judgement and insight intact, normal speech and appearance well-groomed.      Sincerely,      Holly Arenas PA-C       "

## 2025-01-27 NOTE — PATIENT INSTRUCTIONS
Nice to talk with you today. Below is the plan discussed.-  Holly Arenas PA-C     Pt Instructions:  Continue 2.4 mg Wegovy weekly for the next 4 weeks and then let's do a short taper of dosing 2.4 mg Wegovy every 14 days to finish the supply. When you're about 2-3 weeks from being done with your Wegovy supply - give us a call: Nursing line number: [phone] to let us know for sending the 1 mg [facility] compounded semaglutide weekly for continued therapy.     Goals:  Focus on healthy food choices - prioritizing protein and veggies in your meals. I recommend eating every 4-6 hours to reduce the risk of low blood sugars and try to minimize snacking between meals. Stay well hydrated though the day as well.  Great job with exercising - please continue to invest in yourself with regular exercise. Continue to strive for a range for 150-300 minutes of exercise for weight maintenance and incorporating strength training twice-three times a week to help preserve muscle!      Follow up:    Call [phone] to schedule next visit in next available. Be in touch on Mychart for refills/concerns between visits    Compound Semaglutide at [facility]  [facility] is offering compounded semaglutide during the time of Wegovy/Ozempic national shortage. Semaglutide is the generic name of Wegovy (for Weight Management) and Ozempic (for Type 2 Diabetes). [facility] is following the highest standards for sterility and compounding practices. Compounding of semaglutide is legal for as long as Wegovy/Ozempic are on the FDA's drug shortage list. When Wegovy/Ozempic are taken off the FDA's shortage list, compounding semaglutide will no longer be available/legal. Pharmacy can provide 1 last refill up to 1 month from resolution of shortage date on FDA drug shortage list. When this occurs, patients will have to turn to acquiring Wegovy via its available manufactured pen, look into alternative weight  "loss medication(s), or stop the medication. Due to high demand of compound semaglutide, orders may take 1-2 weeks to obtain from time of prescribing.     As with any weight loss medication(s), there is a risk of weight regain should you stop semaglutide. It is important to be aware of this risk should you stop compounded semaglutide with no plans to transition back to an alternative injectable option. The use of semaglutide as a weight management medication, is intended for long term use.     Obtaining Medication From Pharmacy:   Automated call will contact patient when pharmacy has received RX. Patient must call the [facility] back to speak directly to team member prior to shipping medication to verify patient name, product, shipping, and cost. During this call, pharmacy technician will verify if needles/syringes will be needed and can be added to order for $5 additional cost per 10 unit syringe packs. Vials of compounded semaglutide will be mailed from the [facility] pharmacy to your home in a refrigerated box. The vial you will be given is a multi-use vial, meaning that you will use the same vial during the next 28 days for each of your injections. Use a new syringe for each injection. The syringe that will be used to draw up your dose is a \"unit\" syringe, meaning your dose will have an associated \"mg\" and \"units\". Please see your prescription and the below information to verify the dose you should be injecting in UNITS prior to injection.     Storage:  Keep your medication stored in the refrigerator. The vials are to be discarded 28 days after opening (even if there is remaining medication in the vial).     Do not pre-fill syringes from the multi-use vial for future use. Sterility cannot be verified. You should only be drawing up the amount needed for the injection that day, then placing vial back into refrigerator for storage for next injection.     Common Side Effects:  Side effect profile is the " "same as Wegovy. Monitor for nausea, diarrhea, constipation, headache, indigestion, tiredness (fatigue), stomach upset or abdominal pain. Less commonly, semaglutide can cause low blood sugar (symptoms: shaky, dizzy, sweaty, agitation). Please reach out to the care team should you feel like this is occurring. It is important to ensure that you are eating consistent meals and not skipping meals. Ensure you are getting at least 64 oz water daily. If any side effects become unmanageable, contact the care team immediately. See Wegovy package insert for rare but serious side effects, contraindications and warnings.     Dosing:  Every 4 weeks you will have the opportunity/option to increase your dose. However, therapy is individualized for each patient. You should discuss any potential dose changes with your provider first.     Doses available for compound semaglutide: 0.25 mg, 0.5 mg, 1 mg, 1.5 mg, 1.7 mg, 2 mg, 2.4 mg and 2.5 mg.     As previously stated, you will draw up the associated \"unit\" to the your \"mg\" dose prescribed. Please see your provider's recommendations, your prescription and the below table to verify the number of \"units\" you should be drawing up in syringe for your dose. Example, if you are prescribed compound semaglutide 0.5 mg, you will draw up 10 units in the syringe. Use a new syringe for each injection.     *If you are having nausea or other side effects to where you are hesitant to move up to the next dose, stay at the same dose you are on for an additional 2-4 weeks to see if side effect(s) improve/resolve. Make sure to take this time to hydrate and utilizing smaller more consistent meals, such as 4-6 small meals per day.  It may be advantageous to stay at the same dose if you are seeing good efficacy (both on and off the scale) and having minimal/manageable side effects. If you do not have additional refills on that dose's prescription, please reach out to the clinic.    Mg to Unit Conversion " Chart for Reference:         Administration:  Follow the instructions to fill the syringe with medicine. Instructions can be accessed at [email or web address] or by scanning this QR code-    Follow the instructions to inject your medication. Instructions can be accessed at [email or web address]  or by scanning this QR code-      Syringe Disposal:   Place the used syringe in a sharps container. You can buy a sharps container at your local pharmacy.   If you don't have a sharps container, you can use a plastic detergent container with a lid.   Visit Learning About Safe Needle Use and Disposal ([place].[employer]imei) and safeneedledisposal.org for more information.     Cost:   $230 per month for doses 1 mg or less (one 1 mL vial), $370 per month for doses higher than 1 mg (two 1 mL vials)   Optional $5 per 10 pack unit needle/syringe, no additional RX required    [facility] Phone:  [phone]    States to which [facility] is able to ship to: [place], [place], [place], [place], [place], [place]

## 2025-01-27 NOTE — ASSESSMENT & PLAN NOTE
Patient was congratulated on wt loss success thus far. Healthy habits to assist with further weight loss were discussed. Chrissie will continue Wegovy till supply is finished and then pivot to FV semaglutide at 1 mg weekly d/t cost

## 2025-04-10 ENCOUNTER — OFFICE VISIT (OUTPATIENT)
Dept: SURGERY | Facility: CLINIC | Age: 65
End: 2025-04-10
Payer: COMMERCIAL

## 2025-04-10 VITALS
WEIGHT: 182 LBS | HEART RATE: 90 BPM | BODY MASS INDEX: 29.25 KG/M2 | HEIGHT: 66 IN | DIASTOLIC BLOOD PRESSURE: 80 MMHG | SYSTOLIC BLOOD PRESSURE: 119 MMHG | OXYGEN SATURATION: 97 %

## 2025-04-10 DIAGNOSIS — E66.3 OVERWEIGHT (BMI 25.0-29.9): ICD-10-CM

## 2025-04-10 DIAGNOSIS — Z86.39 HX OF OBESITY: Primary | ICD-10-CM

## 2025-04-10 NOTE — ASSESSMENT & PLAN NOTE
Patient was congratulated on wt loss success thus far. Healthy habits to assist with further weight loss were discussed. Chrissie will continue her current supply of Wegovy - unfortunately, unable to pivot to compounded semaglutide at this time. Provided handout for out of pocket options. We also discussed pivoting to oral medications and reviewed Contrave which she will read about and consider and let me know by calling between visits if it is an interest. Otherwise she will continue with her lifestyle changes

## 2025-04-10 NOTE — PROGRESS NOTES
4/10/2025      Return Medical Weight Management Note     Dominique Craft  MRN:  [id number]  :  1960    Assessment & Plan   Problem List Items Addressed This Visit       Hx of obesity - Primary     Patient was congratulated on wt loss success thus far. Healthy habits to assist with further weight loss were discussed. Chrissie will continue her current supply of Wegovy - unfortunately, unable to pivot to compounded semaglutide at this time. Provided handout for out of pocket options. We also discussed pivoting to oral medications and reviewed Contrave which she will read about and consider and let me know by calling between visits if it is an interest. Otherwise she will continue with her lifestyle changes           Overweight (BMI 25.0-29.9)        AOM Considerations from prior:  Phentermine:  lomaira started 23 - not effective  Topiramate: hx of stones, Current birth control: hyesterectomy          GLP-1: wegovy, saxenda appear covered - she is familiar w/wegovy and researched it. Discussed        Naltrexone:    reviewed narcotic considerations 2025         Wellbutrin:  used previously to quit smoking and notes no bothersome side effects and was a good experience. Reviewed titrate/taper and mental health/SI montiroing 2025       Metformin: used previously for preDM in the past but doesn't feel it was familiar.           Contrave: appear covered  Qsymia: avoid d/t kidney stones      PATIENT INSTRUCTIONS:  Pt Instructions:  Finish your supply of Wegovy and see how you do and keep me posted.  Read about Contrave for consideration for starting after Wegovy. If interested in starting between visits just call and I can send that script in.     Goals:  Focus on healthy food choices - prioritizing protein and veggies in your meals. I recommend eating every 4-6 hours to reduce the risk of low blood sugars and try to minimize snacking between meals. Stay well hydrated though the day as well.  Great job with  exercising - please continue to invest in yourself with regular exercise. Continue to strive for a range for 150-300 minutes of exercise for weight maintenance and incorporating strength training twice-three times a week to help preserve muscle!      Follow up:    Call [phone] to schedule next visit in next available. Be in touch on Mychart for refills/concerns between visits    21 minutes spent on the date of the encounter doing chart review, history and exam, result review, counseling, developing plan of care, documentation, and further activities as noted      INTERVAL HISTORY:  Chrissie returns for medical weight management follow up.  Last seen on 1/27/25 w/myself and plan to continue 2.4 mg Wegovy weekly with consideration of pivoting to compounded semaglutide after current supply was done.    She has 2 pens of the 2.4 mg dose and doing dosing every 2 weeks. Interested in     She does feel that she was doing well with cravings. Less exercise in January/February so feeling better now and mentally better doing gardening and such w/her daughter's.    WEIGHT METRICS:  Body mass index is 29.38 kg/m .   Current Weight: 182 lb (82.6 kg)  Last Visits Weight: 184 lb (83.5 kg)  Initial Weight (lbs): 212 lbs  Cumulative weight loss (lbs): 30  Weight Loss Percentage: 14.15%    Wt Readings from Last 10 Encounters:   04/10/25 182 lb (82.6 kg)   01/27/25 184 lb 12.8 oz (83.8 kg)   11/06/24 196 lb (88.9 kg)   09/03/24 201 lb (91.2 kg)   08/21/24 201 lb (91.2 kg)   04/17/24 212 lb (96.2 kg)   01/11/24 207 lb (93.9 kg)   01/11/24 207 lb (93.9 kg)   11/16/23 206 lb (93.4 kg)   11/15/23 206 lb (93.4 kg)             LABS:  Hemoglobin A1C   Date Value Ref Range Status   09/03/2024 5.3 0.0 - 5.6 % Final     Comment:     Normal <5.7%   Prediabetes 5.7-6.4%    Diabetes 6.5% or higher     Note: Adopted from ADA consensus guidelines.   09/20/2013 5.4 4.3 - 6.0 % Final     Creatinine   Date Value Ref Range Status   09/03/2024 0.94 0.51  - 0.95 mg/dL Final   09/20/2013 0.85 0.52 - 1.04 mg/dL Final     GFR Estimate   Date Value Ref Range Status   09/03/2024 68 >60 mL/min/1.73m2 Final     Comment:     eGFR calculated using 2021 CKD-EPI equation.   09/20/2013 71 >60 mL/min/1.7m2 Final     Carbon Dioxide   Date Value Ref Range Status   09/20/2013 27 20 - 32 mmol/L Final     Carbon Dioxide (CO2)   Date Value Ref Range Status   09/03/2024 27 22 - 29 mmol/L Final   06/10/2022 29 20 - 32 mmol/L Final     Chloride   Date Value Ref Range Status   09/03/2024 102 98 - 107 mmol/L Final   06/10/2022 108 94 - 109 mmol/L Final   09/20/2013 105 94 - 109 mmol/L Final     Urea Nitrogen   Date Value Ref Range Status   09/03/2024 16.0 8.0 - 23.0 mg/dL Final   06/10/2022 19 7 - 30 mg/dL Final   09/20/2013 15 7 - 30 mg/dL Final     ALT   Date Value Ref Range Status   06/28/2023 21 0 - 50 U/L Final     Comment:     Reference intervals for this test were updated on 6/12/2023 to more accurately reflect our healthy population. There may be differences in the flagging of prior results with similar values performed with this method. Interpretation of those prior results can be made in the context of the updated reference intervals.     04/07/2021 28 U/L Final     AST   Date Value Ref Range Status   06/28/2023 16 0 - 45 U/L Final     Comment:     Reference intervals for this test were updated on 6/12/2023 to more accurately reflect our healthy population. There may be differences in the flagging of prior results with similar values performed with this method. Interpretation of those prior results can be made in the context of the updated reference intervals.   04/07/2021 20 U/L Final     Vitamin D, Total (25-Hydroxy)   Date Value Ref Range Status   09/03/2024 49 20 - 50 ng/mL Final     Comment:     optimum levels     TSH   Date Value Ref Range Status   11/11/2024 0.79 0.30 - 4.20 uIU/mL Final   06/10/2022 0.66 0.40 - 4.00 mU/L Final   09/20/2013 0.72 0.4 - 5.0 mU/L Final     "    BP Readings from Last 6 Encounters:   04/10/25 119/80   01/27/25 131/86   11/06/24 107/73   09/03/24 138/82   08/21/24 99/60   04/17/24 124/78       Pulse Readings from Last 6 Encounters:   04/10/25 90   01/27/25 82   11/06/24 80   09/03/24 82   08/21/24 86   04/17/24 90       PE:  /80   Pulse 90   Ht 5' 6\" (1.676 m)   Wt 182 lb (82.6 kg)   SpO2 97%   BMI 29.38 kg/m    GENERAL: Healthy, alert and no distress  EYES: Eyes grossly normal to inspection.    RESP: No audible wheeze, cough, or visible cyanosis.  No increased work of breathing.    SKIN: Visible skin clear. No significant rash, abnormal pigmentation or lesions.  NEURO: Mentation and speech appropriate for age.  PSYCH: Mentation appears normal, affect normal/bright, judgement and insight intact, normal speech and appearance well-groomed.      Sincerely,      Holly Arenas PA-C       "

## 2025-04-10 NOTE — PATIENT INSTRUCTIONS
Nice to talk with you today. Below is the plan discussed.-  Holly Arenas PA-C     Pt Instructions:  Finish your supply of Wegovy and see how you do and keep me posted.  Read about Contrave for consideration for starting after Wegovy. If interested in starting between visits just call and I can send that script in.     Goals:  Focus on healthy food choices - prioritizing protein and veggies in your meals. I recommend eating every 4-6 hours to reduce the risk of low blood sugars and try to minimize snacking between meals. Stay well hydrated though the day as well.  Great job with exercising - please continue to invest in yourself with regular exercise. Continue to strive for a range for 150-300 minutes of exercise for weight maintenance and incorporating strength training twice-three times a week to help preserve muscle!      Follow up:    Call [phone] to schedule next visit in next available. Be in touch on Mychart for refills/concerns between visits    Options for continuing GLP1/GIP agonist in 2025:  Wegovy or Zepbound pens out of pocket cost (>$1000/month cash price without savings card)   Zepbound  Cost of any dose with savings card (link below to sign up): $650/month with card at any pharmacy   https://[email or web address].[email or web address]/enroll/checkEnrollment - Medicaid, Medicare or other government insurances cannot use savings cards  Wegovy   Cost of any dose with savings card (link below to sign up): $499/month with card at any pharmacy   https://www.[employer]/coverage-[email or web address]-savings/save-on-wegovy.html - Medicaid, Medicare or other government insurances cannot use savings cards  Cost for any dose through [facility]: $499/month - only for uninsured or have commercial insurance. This is [facility] mail order pharmacy:   https://[email or web address].Glovico/obesity/products/wegovy/get-product.html    Zepbound Vials through [facility] - vials only available in 2.5 mg, 5 mg, 7.5mg,  10 mg doses  [email or web address]  $349/month for 2.5 mg vials  $499/month for 5 mg vials   7.5 mg and 10 mg vials now available for $499/month with regular refills (cost increases if refills not consistently filled at least every 45 days - see more info at [employer] [facility] website)  Additional $5 per month for administrations supplies (syringe/needles, etc)       CONTRAVE (bupropion and naltrexone)    We are considering starting Contrave. Contrave is specifically prescribed for obesity. It is a combination of two medications, Naltrexone and Bupropione (Wellbutrin). The Bupropion helps lessen appetite and the Naltrexone works by blocking certain receptors in the brain and curbing cravings.      For some of our patients the medication works right away. Other patients don't feel much of a change but find they've lost weight. Like all weight loss medications, Contrave works best when you help it work. This means:  1. Having less tempting high calorie (fattening) food around the house or office. (For people with strong cravings this is very important.)   2. Staying away from situations or people that may trigger your cravings .   3. Eating out only one time or less each week.  4. Eating your meals at a table with the TV or computer off.    WARNING: This medication blocks the action of opioid type pain medications. If you routinely take any medication like Codeine, Oxycontin, Percocet, Morphine, Dilaudid or Methodone, do not take this until you have talked with weight management staff.     FYI- If you are planning on having an elective surgery, you should start titrating yourself off Contrave 4 weeks prior to surgery. This would entail decreasing the same way you started the medication, by taking one tablet less per week for 4 weeks, until you are able to stop the medication. If you need to stop it quicker, you can take 1 tablet in the morning and 1 tablet in the evening for 2 weeks, and then stop  the medication. Please don't hesitate to call if you have any questions regarding this.    Dosing as follows:  Week 1- 1 tablet in the morning  Week 2- 1 tablet in the morning and 1 tablet at bedtime  Week 3- 2 tablets in the morning and 1 tablet at bedtime  Week 4 and thereafter- 2 tablets in the morning and 2 tablets at bedtime    Side-effects: The most common side effect include: nausea; constipation; headache; vomiting; dizziness; diarrhea; trouble sleeping; and dry mouth.     This medication typically isn t covered by insurance and will require a prior authorization, which can take 1-2 weeks to review. Patients can visit www.contrave.com and sign up for the Pharmacy savings program and receive the medication for $60-70 per month for 12 months if eligible.    For any questions or concerns please send a [employer] message to our team or call our weight management call center at [phone] during regular business hours. For questions during evenings or weekends your messages will be addressed during the next business day.  For emergencies please call 911 or seek immediate medical care.     (Do not stop taking it if you don't think it's working. For some people it works without them knowing it.)

## 2025-05-20 ENCOUNTER — TELEPHONE (OUTPATIENT)
Dept: SURGERY | Facility: CLINIC | Age: 65
End: 2025-05-20
Payer: COMMERCIAL

## 2025-05-20 DIAGNOSIS — Z86.39 HX OF OBESITY: Primary | ICD-10-CM

## 2025-05-20 DIAGNOSIS — E66.3 OVERWEIGHT (BMI 25.0-29.9): ICD-10-CM

## 2025-05-20 NOTE — TELEPHONE ENCOUNTER
Called pt back as she doesn't use MyChart.    Reviewed Zepbound vial option for 2.5 mg dose but no generic options. Reviewed oop GLP costs - and these are too expensive for her right now which is frustrating given how well she had been doing with Wegovy.    Reviewed SL FV semaglutide and unknown efficacy/response/SE/risks/benefits for it.    She reports being off Wegovy for 2 week having a lot more food noise. She does feel she is doing well despite this - not gaining weight.    She states she doesn't mind trying oral medications. She had questions about having a drink/with upcoming. She generally isn't drinking alcohol much or a heavier drinker but may want a drink for upcoming. We discussed typically avoiding alcohol is what we recommend for weight management but otherwise keeping it minimial and reviewed reduced seizure threshold for both wellbutrin/alcohol and she reports typically very minimal alcohol and only special occasions.    She states she's open to trying Contrave then. We reviewed the starting titration up to 2 tab BID (and fine to stay at a lower dose). Reviewed reminder to monitor BP w/wellbutrin portion (she has BP pressure cuff) and liver testing in 1-2 months. Handout had been provided at last visit.    - Holly Arenas PA-C       BP Readings from Last 6 Encounters:   04/10/25 119/80   01/27/25 131/86   11/06/24 107/73   09/03/24 138/82   08/21/24 99/60   04/17/24 124/78       Pulse Readings from Last 6 Encounters:   04/10/25 90   01/27/25 82   11/06/24 80   09/03/24 82   08/21/24 86   04/17/24 90

## 2025-05-20 NOTE — TELEPHONE ENCOUNTER
"Patient called the [facility] directly stating Holly WHALEY told her she could call the triage \"whenever she wanted to discuss options for medications\".    Chrissie tells me that she took her last injection of Wegovy 2.4 mg on 5/5/25.    She is wanting to know if KW can prescribe her any other GLP-1 injectable that may be more cost effective than her current $499 price she's paying for Wegovy pens.    She did read about Contrave but is not interested.    Will route to provider. Next appt 7/7/25 and 9/8/25.    Eva PETERS, RN, BSN      "

## 2025-06-05 ENCOUNTER — TELEPHONE (OUTPATIENT)
Dept: SURGERY | Facility: CLINIC | Age: 65
End: 2025-06-05
Payer: COMMERCIAL

## 2025-06-05 NOTE — TELEPHONE ENCOUNTER
Pt called to report that has been on Contrave for 3 weeks now and would like to come off.  Rationale:  Is not doing anything for her.  Is maybe even making pt eat more to get rid of the different taste pt has in her mouth and dry mouth.  Also does not like that is doesn't control the food noise like the GLP1 did.  Need to know if needs to be weaned off.  Will get back to pt via phone once sent to provider.  Suzy Kay, MS, RD, RN

## 2025-06-05 NOTE — TELEPHONE ENCOUNTER
Taper Contrave similar to starting: reduce 1 tab each week till finished.     Ex: if taking 2 tab in morning and 2 tab in evening  Week 1: 2 tab in morning and 1 tab in evening  Week 2: 1 tab BID  Week 3: 1 tab every day  Week 4: stop     Rather limited in other options. Happy to see again in a visit to discuss considerations - likely off a cancellation I would suspect.    Pt informed on how to decrease Contrave.  States is happy to just keep appt in July and discuss any options at that time.  Suzy Kay, MS, RD, RN

## 2025-06-12 ENCOUNTER — TELEPHONE (OUTPATIENT)
Dept: SURGERY | Facility: CLINIC | Age: 65
End: 2025-06-12
Payer: COMMERCIAL

## 2025-06-12 ENCOUNTER — PATIENT OUTREACH (OUTPATIENT)
Dept: CARE COORDINATION | Facility: CLINIC | Age: 65
End: 2025-06-12
Payer: COMMERCIAL

## 2025-06-12 NOTE — TELEPHONE ENCOUNTER
Pt requested hepatic panel be cancelled due to no longer on Contrave.  4/10/25 Contrave ordered after Wegovy no longer paid for.   Contrave approved 5/20/25.  Pt weaned off Contrave 6/5/25 due to not doing anything for pt.   Is seeing provider in July.   Will route to provider for request.  Suzy Kay, MS, RD, RN

## 2025-06-12 NOTE — TELEPHONE ENCOUNTER
ERIKA Barrera PA-C: Yes can cancel if she's no longer taking Contrave.   This was completed.  Pt already expects will be done.  Suzy Kay, MS, RD, RN